# Patient Record
Sex: FEMALE | Race: WHITE | NOT HISPANIC OR LATINO | Employment: FULL TIME | URBAN - METROPOLITAN AREA
[De-identification: names, ages, dates, MRNs, and addresses within clinical notes are randomized per-mention and may not be internally consistent; named-entity substitution may affect disease eponyms.]

---

## 2017-09-25 ENCOUNTER — CONVERSION ENCOUNTER (OUTPATIENT)
Dept: MAMMOGRAPHY | Facility: HOSPITAL | Age: 47
End: 2017-09-25

## 2018-03-19 ENCOUNTER — TELEPHONE CONVERTED (OUTPATIENT)
Dept: ONCOLOGY | Facility: HOSPITAL | Age: 48
End: 2018-03-19

## 2018-05-25 ENCOUNTER — TELEPHONE CONVERTED (OUTPATIENT)
Dept: ONCOLOGY | Facility: HOSPITAL | Age: 48
End: 2018-05-25

## 2018-08-09 ENCOUNTER — TELEPHONE (OUTPATIENT)
Dept: MAMMOGRAPHY | Facility: CLINIC | Age: 48
End: 2018-08-09

## 2018-08-09 NOTE — TELEPHONE ENCOUNTER
Called pt to follow up on canceled appointment, left message for her to call the office back to let us know her decision for care. Wanting to know if she is ready to reschedule her appointment or if she found care elsewhere.     Serenity Salvador RN

## 2018-08-20 ENCOUNTER — TELEPHONE (OUTPATIENT)
Dept: MAMMOGRAPHY | Facility: CLINIC | Age: 48
End: 2018-08-20

## 2018-08-20 NOTE — TELEPHONE ENCOUNTER
Called [facility] center re: a referral they sent over on this pt. Pt was scheduled for 07/27/2018 and she canceled due to Mother in hospital. We tried to reach out to pt several times to get her rescheduled with no luck in contacting pt.     Spoke with Elena at [facility] and advised her of the above. She returned call minutes later and stated that she had spoken with pt and she is still very interested in making this appointment however there are issues with her mothers health that is keeping her from making any commitments.     She states that she will call in a few weeks and reschedule.

## 2018-09-26 ENCOUNTER — CONVERSION ENCOUNTER (OUTPATIENT)
Dept: MAMMOGRAPHY | Facility: HOSPITAL | Age: 48
End: 2018-09-26

## 2018-12-07 ENCOUNTER — TELEPHONE (OUTPATIENT)
Dept: MAMMOGRAPHY | Facility: CLINIC | Age: 48
End: 2018-12-07

## 2018-12-07 NOTE — TELEPHONE ENCOUNTER
Left patient a voicemail to call our office and let us know her status on her care for breast cancer - patient had an appointment with us back in July that was canceled and stated she would reschedule however we never heard back from her.

## 2019-02-07 ENCOUNTER — HOSPITAL ENCOUNTER (OUTPATIENT)
Dept: OTHER | Facility: HOSPITAL | Age: 49
Discharge: HOME | End: 2019-02-07
Attending: NURSE PRACTITIONER

## 2019-02-07 LAB
ALBUMIN SERPL-MCNC: 3.5 G/DL (ref 3.5–5)
ALBUMIN/GLOB SERPL: 1.2 {RATIO} (ref 1.4–2.6)
ALP SERPL-CCNC: 73 U/L (ref 42–98)
ALT SERPL-CCNC: 27 U/L (ref 10–40)
ANION GAP SERPL CALC-SCNC: 18 MMOL/L (ref 8–19)
AST SERPL-CCNC: 22 U/L (ref 15–50)
BASOPHILS # BLD AUTO: 0.02 10*3/UL (ref 0–0.2)
BASOPHILS NFR BLD AUTO: 0.43 % (ref 0–3)
BILIRUB SERPL-MCNC: 0.33 MG/DL (ref 0.2–1.3)
BUN SERPL-MCNC: 7 MG/DL (ref 5–25)
BUN/CREAT SERPL: 12 {RATIO} (ref 6–20)
CALCIUM SERPL-MCNC: 9 MG/DL (ref 8.7–10.4)
CHLORIDE SERPL-SCNC: 104 MMOL/L (ref 99–111)
CONV CO2: 23 MMOL/L (ref 22–32)
CONV TOTAL PROTEIN: 6.4 G/DL (ref 6.3–8.2)
CREAT UR-MCNC: 0.57 MG/DL (ref 0.5–0.9)
EOSINOPHIL # BLD AUTO: 0.42 10*3/UL (ref 0–0.7)
EOSINOPHIL # BLD AUTO: 8.17 % (ref 0–7)
ERYTHROCYTE [DISTWIDTH] IN BLOOD BY AUTOMATED COUNT: 11.2 % (ref 11.5–14.5)
GFR SERPLBLD BASED ON 1.73 SQ M-ARVRAT: >60 ML/MIN/{1.73_M2}
GLOBULIN UR ELPH-MCNC: 2.9 G/DL (ref 2–3.5)
GLUCOSE SERPL-MCNC: 299 MG/DL (ref 65–99)
HBA1C MFR BLD: 14.5 G/DL (ref 12–16)
HCT VFR BLD AUTO: 41.8 % (ref 37–47)
LYMPHOCYTES # BLD AUTO: 0.98 10*3/UL (ref 1–5)
MCH RBC QN AUTO: 30.8 PG (ref 27–31)
MCHC RBC AUTO-ENTMCNC: 34.7 G/DL (ref 33–37)
MCV RBC AUTO: 88.6 FL (ref 81–99)
MONOCYTES # BLD AUTO: 0.56 10*3/UL (ref 0.2–1.2)
MONOCYTES NFR BLD AUTO: 11 % (ref 3–10)
NEUTROPHILS # BLD AUTO: 3.13 10*3/UL (ref 2–8)
NEUTROPHILS NFR BLD AUTO: 61.2 % (ref 30–85)
NRBC BLD AUTO-RTO: 0 % (ref 0–0.01)
OSMOLALITY SERPL CALC.SUM OF ELEC: 301 MOSM/KG (ref 273–304)
PLATELET # BLD AUTO: 124 10*3/UL (ref 130–400)
PMV BLD AUTO: 8.2 FL (ref 7.4–10.4)
POTASSIUM SERPL-SCNC: 3.7 MMOL/L (ref 3.5–5.3)
RBC # BLD AUTO: 4.72 10*6/UL (ref 4.2–5.4)
SODIUM SERPL-SCNC: 141 MMOL/L (ref 135–147)
VARIANT LYMPHS NFR BLD MANUAL: 19.2 % (ref 20–45)
WBC # BLD AUTO: 5.11 10*3/UL (ref 4.8–10.8)

## 2019-02-08 ENCOUNTER — HOSPITAL ENCOUNTER (OUTPATIENT)
Dept: MAMMOGRAPHY | Facility: HOSPITAL | Age: 49
Discharge: HOME | End: 2019-02-08
Attending: NURSE PRACTITIONER

## 2019-02-08 LAB
CANCER AG15-3 SERPL-ACNC: 17.9 U/ML (ref 0–25)
CANCER AG27-29 SERPL-ACNC: 14.5 U/ML (ref 0–38.6)

## 2019-02-15 ENCOUNTER — HOSPITAL ENCOUNTER (OUTPATIENT)
Dept: CT IMAGING | Facility: HOSPITAL | Age: 49
Discharge: HOME | End: 2019-02-15
Attending: NURSE PRACTITIONER

## 2019-03-12 ENCOUNTER — HOSPITAL ENCOUNTER (OUTPATIENT)
Dept: OTHER | Facility: HOSPITAL | Age: 49
Discharge: HOME | End: 2019-03-12
Attending: NURSE PRACTITIONER

## 2019-03-12 LAB
BASOPHILS # BLD AUTO: 0.04 10*3/UL (ref 0–0.2)
BASOPHILS NFR BLD AUTO: 0.8 % (ref 0–3)
CONV ABS IMM GRAN: 0.02 10*3/UL (ref 0–0.2)
CONV IMMATURE GRAN: 0.4 % (ref 0–1.8)
DEPRECATED RDW RBC AUTO: 39.1 FL (ref 36.4–46.3)
EOSINOPHIL # BLD AUTO: 0.25 10*3/UL (ref 0–0.7)
EOSINOPHIL # BLD AUTO: 5.1 % (ref 0–7)
ERYTHROCYTE [DISTWIDTH] IN BLOOD BY AUTOMATED COUNT: 12 % (ref 11.7–14.4)
HBA1C MFR BLD: 15.2 G/DL (ref 12–16)
HCT VFR BLD AUTO: 43.5 % (ref 37–47)
LYMPHOCYTES # BLD AUTO: 1.16 10*3/UL (ref 1–5)
MCH RBC QN AUTO: 31.3 PG (ref 27–31)
MCHC RBC AUTO-ENTMCNC: 34.9 G/DL (ref 33–37)
MCV RBC AUTO: 89.7 FL (ref 81–99)
MONOCYTES # BLD AUTO: 0.46 10*3/UL (ref 0.2–1.2)
MONOCYTES NFR BLD AUTO: 9.4 % (ref 3–10)
NEUTROPHILS # BLD AUTO: 2.94 10*3/UL (ref 2–8)
NEUTROPHILS NFR BLD AUTO: 60.5 % (ref 30–85)
NRBC CBCN: 0 % (ref 0–0.7)
PLATELET # BLD AUTO: 117 10*3/UL (ref 130–400)
PMV BLD AUTO: 11.4 FL (ref 9.4–12.3)
RBC # BLD AUTO: 4.85 10*6/UL (ref 4.2–5.4)
VARIANT LYMPHS NFR BLD MANUAL: 23.8 % (ref 20–45)
WBC # BLD AUTO: 4.87 10*3/UL (ref 4.8–10.8)

## 2019-03-14 ENCOUNTER — HOSPITAL ENCOUNTER (OUTPATIENT)
Dept: MRI IMAGING | Facility: HOSPITAL | Age: 49
Discharge: HOME | End: 2019-03-14
Attending: NURSE PRACTITIONER

## 2019-04-01 ENCOUNTER — TELEPHONE CONVERTED (OUTPATIENT)
Dept: ONCOLOGY | Facility: HOSPITAL | Age: 49
End: 2019-04-01

## 2019-04-19 ENCOUNTER — HOSPITAL ENCOUNTER (OUTPATIENT)
Dept: OTHER | Facility: HOSPITAL | Age: 49
Discharge: HOME | End: 2019-04-19
Attending: NURSE PRACTITIONER

## 2019-05-06 ENCOUNTER — HOSPITAL ENCOUNTER (OUTPATIENT)
Dept: OTHER | Facility: HOSPITAL | Age: 49
Discharge: HOME | End: 2019-05-06
Attending: NURSE PRACTITIONER

## 2019-05-06 LAB
APPEARANCE UR: CLEAR
BILIRUB UR QL: NEGATIVE
COLOR UR: YELLOW
CONV COLLECTION SOURCE (UA): ABNORMAL
CONV CREATININE URINE, RANDOM: 42.6 MG/DL (ref 10–300)
CONV MICROALBUM.,U,RANDOM: <12 MG/L (ref 0–20)
CONV UROBILINOGEN IN URINE BY AUTOMATED TEST STRIP: 0.2 {EHRLICHU}/DL (ref 0.1–1)
GLUCOSE UR QL: >=1000 MG/DL
HGB UR QL STRIP: NEGATIVE
KETONES UR QL STRIP: 15 MG/DL
LEUKOCYTE ESTERASE UR QL STRIP: NEGATIVE
MICROALBUMIN/CREAT UR: 28.2 MG/G{CRE} (ref 0–35)
NITRITE UR QL STRIP: NEGATIVE
PH UR STRIP.AUTO: 5 [PH] (ref 5–8)
PROT UR QL: NEGATIVE MG/DL
SP GR UR: 1.04 (ref 1–1.03)

## 2019-05-24 ENCOUNTER — HOSPITAL ENCOUNTER (OUTPATIENT)
Dept: OTHER | Facility: HOSPITAL | Age: 49
Discharge: HOME | End: 2019-05-24
Attending: NURSE PRACTITIONER

## 2019-06-14 ENCOUNTER — HOSPITAL ENCOUNTER (OUTPATIENT)
Dept: OTHER | Facility: HOSPITAL | Age: 49
Discharge: HOME | End: 2019-06-14
Attending: NURSE PRACTITIONER

## 2019-06-14 LAB
ALBUMIN SERPL-MCNC: 4.1 G/DL (ref 3.5–5)
ALBUMIN/GLOB SERPL: 1.6 {RATIO} (ref 1.4–2.6)
ALP SERPL-CCNC: 77 U/L (ref 42–98)
ALT SERPL-CCNC: 28 U/L (ref 10–40)
ANION GAP SERPL CALC-SCNC: 19 MMOL/L (ref 8–19)
AST SERPL-CCNC: 38 U/L (ref 15–50)
BASOPHILS # BLD AUTO: 0.03 10*3/UL (ref 0–0.2)
BASOPHILS NFR BLD AUTO: 0.4 % (ref 0–3)
BILIRUB SERPL-MCNC: 0.27 MG/DL (ref 0.2–1.3)
BUN SERPL-MCNC: 14 MG/DL (ref 5–25)
BUN/CREAT SERPL: 25 {RATIO} (ref 6–20)
CALCIUM SERPL-MCNC: 8.9 MG/DL (ref 8.7–10.4)
CHLORIDE SERPL-SCNC: 98 MMOL/L (ref 99–111)
CONV ABS IMM GRAN: 0.03 10*3/UL (ref 0–0.2)
CONV CO2: 24 MMOL/L (ref 22–32)
CONV IMMATURE GRAN: 0.4 % (ref 0–1.8)
CONV TOTAL PROTEIN: 6.7 G/DL (ref 6.3–8.2)
CREAT UR-MCNC: 0.56 MG/DL (ref 0.5–0.9)
DEPRECATED RDW RBC AUTO: 39.9 FL (ref 36.4–46.3)
EOSINOPHIL # BLD AUTO: 0.24 10*3/UL (ref 0–0.7)
EOSINOPHIL # BLD AUTO: 3.2 % (ref 0–7)
ERYTHROCYTE [DISTWIDTH] IN BLOOD BY AUTOMATED COUNT: 11.9 % (ref 11.7–14.4)
FSH SERPL-ACNC: 35.9 M[IU]/ML
GFR SERPLBLD BASED ON 1.73 SQ M-ARVRAT: >60 ML/MIN/{1.73_M2}
GLOBULIN UR ELPH-MCNC: 2.6 G/DL (ref 2–3.5)
GLUCOSE SERPL-MCNC: 287 MG/DL (ref 65–99)
HBA1C MFR BLD: 15.2 G/DL (ref 12–16)
HCT VFR BLD AUTO: 44 % (ref 37–47)
LH SERPL-ACNC: 19.2 M[IU]/ML
LYMPHOCYTES # BLD AUTO: 1.43 10*3/UL (ref 1–5)
MCH RBC QN AUTO: 31.5 PG (ref 27–31)
MCHC RBC AUTO-ENTMCNC: 34.5 G/DL (ref 33–37)
MCV RBC AUTO: 91.3 FL (ref 81–99)
MONOCYTES # BLD AUTO: 0.67 10*3/UL (ref 0.2–1.2)
MONOCYTES NFR BLD AUTO: 8.9 % (ref 3–10)
NEUTROPHILS # BLD AUTO: 5.15 10*3/UL (ref 2–8)
NEUTROPHILS NFR BLD AUTO: 68.2 % (ref 30–85)
NRBC CBCN: 0 % (ref 0–0.7)
OSMOLALITY SERPL CALC.SUM OF ELEC: 295 MOSM/KG (ref 273–304)
PLATELET # BLD AUTO: 124 10*3/UL (ref 130–400)
PMV BLD AUTO: 11.8 FL (ref 9.4–12.3)
POTASSIUM SERPL-SCNC: 3.8 MMOL/L (ref 3.5–5.3)
RBC # BLD AUTO: 4.82 10*6/UL (ref 4.2–5.4)
SODIUM SERPL-SCNC: 137 MMOL/L (ref 135–147)
VARIANT LYMPHS NFR BLD MANUAL: 18.9 % (ref 20–45)
WBC # BLD AUTO: 7.55 10*3/UL (ref 4.8–10.8)

## 2019-06-15 LAB — CANCER AG27-29 SERPL-ACNC: 21.7 U/ML (ref 0–38.6)

## 2019-06-17 ENCOUNTER — HOSPITAL ENCOUNTER (OUTPATIENT)
Dept: GENERAL RADIOLOGY | Facility: HOSPITAL | Age: 49
Discharge: HOME | End: 2019-06-17
Attending: NURSE PRACTITIONER

## 2019-07-12 ENCOUNTER — HOSPITAL ENCOUNTER (OUTPATIENT)
Dept: OTHER | Facility: HOSPITAL | Age: 49
Discharge: HOME | End: 2019-07-12
Attending: NURSE PRACTITIONER

## 2019-09-20 ENCOUNTER — HOSPITAL ENCOUNTER (OUTPATIENT)
Dept: OTHER | Facility: HOSPITAL | Age: 49
Discharge: HOME | End: 2019-09-20
Attending: NURSE PRACTITIONER

## 2019-09-27 ENCOUNTER — HOSPITAL ENCOUNTER (OUTPATIENT)
Dept: URGENT CARE | Facility: CLINIC | Age: 49
Discharge: HOME | End: 2019-09-27

## 2019-09-30 ENCOUNTER — HOSPITAL ENCOUNTER (OUTPATIENT)
Dept: MAMMOGRAPHY | Facility: HOSPITAL | Age: 49
Discharge: HOME | End: 2019-09-30
Attending: NURSE PRACTITIONER

## 2019-10-11 ENCOUNTER — HOSPITAL ENCOUNTER (OUTPATIENT)
Dept: OTHER | Facility: HOSPITAL | Age: 49
Discharge: HOME | End: 2019-10-11
Attending: NURSE PRACTITIONER

## 2019-10-11 LAB
BASOPHILS # BLD AUTO: 0.06 10*3/UL (ref 0–0.2)
BASOPHILS NFR BLD AUTO: 1 % (ref 0–3)
CONV ABS IMM GRAN: 0.03 10*3/UL (ref 0–0.2)
CONV IMMATURE GRAN: 0.5 % (ref 0–1.8)
DEPRECATED RDW RBC AUTO: 38.5 FL (ref 36.4–46.3)
EOSINOPHIL # BLD AUTO: 0.37 10*3/UL (ref 0–0.7)
EOSINOPHIL # BLD AUTO: 6.3 % (ref 0–7)
ERYTHROCYTE [DISTWIDTH] IN BLOOD BY AUTOMATED COUNT: 11.7 % (ref 11.7–14.4)
FOLATE SERPL-MCNC: >20 NG/ML (ref 4.8–20)
HCT VFR BLD AUTO: 46.3 % (ref 37–47)
HGB BLD-MCNC: 15.9 G/DL (ref 12–16)
LYMPHOCYTES # BLD AUTO: 1.31 10*3/UL (ref 1–5)
LYMPHOCYTES NFR BLD AUTO: 22.2 % (ref 20–45)
MCH RBC QN AUTO: 31 PG (ref 27–31)
MCHC RBC AUTO-ENTMCNC: 34.3 G/DL (ref 33–37)
MCV RBC AUTO: 90.3 FL (ref 81–99)
MONOCYTES # BLD AUTO: 0.44 10*3/UL (ref 0.2–1.2)
MONOCYTES NFR BLD AUTO: 7.5 % (ref 3–10)
NEUTROPHILS # BLD AUTO: 3.68 10*3/UL (ref 2–8)
NEUTROPHILS NFR BLD AUTO: 62.5 % (ref 30–85)
NRBC CBCN: 0 % (ref 0–0.7)
PLATELET # BLD AUTO: 185 10*3/UL (ref 130–400)
PMV BLD AUTO: 11 FL (ref 9.4–12.3)
RBC # BLD AUTO: 5.13 10*6/UL (ref 4.2–5.4)
VIT B12 SERPL-MCNC: 748 PG/ML (ref 211–911)
WBC # BLD AUTO: 5.89 10*3/UL (ref 4.8–10.8)

## 2019-11-08 ENCOUNTER — HOSPITAL ENCOUNTER (OUTPATIENT)
Dept: OTHER | Facility: HOSPITAL | Age: 49
Discharge: HOME | End: 2019-11-08
Attending: NURSE PRACTITIONER

## 2019-12-13 ENCOUNTER — HOSPITAL ENCOUNTER (OUTPATIENT)
Dept: OTHER | Facility: HOSPITAL | Age: 49
Discharge: HOME | End: 2019-12-13
Attending: NURSE PRACTITIONER

## 2020-01-02 ENCOUNTER — HOSPITAL ENCOUNTER (OUTPATIENT)
Dept: URGENT CARE | Facility: CLINIC | Age: 50
Discharge: HOME | End: 2020-01-02

## 2020-01-09 ENCOUNTER — HOSPITAL ENCOUNTER (OUTPATIENT)
Dept: OTHER | Facility: HOSPITAL | Age: 50
Discharge: HOME | End: 2020-01-09
Attending: NURSE PRACTITIONER

## 2020-01-10 LAB — 1,25(OH)2D3 SERPL-MCNC: 71.9 PG/ML (ref 19.9–79.3)

## 2020-04-10 ENCOUNTER — HOSPITAL ENCOUNTER (OUTPATIENT)
Dept: MRI IMAGING | Facility: HOSPITAL | Age: 50
Discharge: HOME | End: 2020-04-10
Attending: NURSE PRACTITIONER

## 2020-04-10 LAB
CREAT BLD-MCNC: 0.6 MG/DL (ref 0.6–1.4)
GFR SERPLBLD BASED ON 1.73 SQ M-ARVRAT: >60 ML/MIN/{1.73_M2}

## 2020-05-29 ENCOUNTER — HOSPITAL ENCOUNTER (OUTPATIENT)
Dept: NUCLEAR MEDICINE | Facility: HOSPITAL | Age: 50
Discharge: HOME | End: 2020-05-29
Attending: NURSE PRACTITIONER

## 2020-06-09 ENCOUNTER — OFFICE VISIT CONVERTED (OUTPATIENT)
Dept: ONCOLOGY | Facility: HOSPITAL | Age: 50
End: 2020-06-09
Attending: NURSE PRACTITIONER

## 2020-08-06 ENCOUNTER — HOSPITAL ENCOUNTER (OUTPATIENT)
Dept: OTHER | Facility: HOSPITAL | Age: 50
Discharge: HOME | End: 2020-08-06
Attending: NURSE PRACTITIONER

## 2020-08-06 ENCOUNTER — OFFICE VISIT CONVERTED (OUTPATIENT)
Dept: ONCOLOGY | Facility: HOSPITAL | Age: 50
End: 2020-08-06
Attending: NURSE PRACTITIONER

## 2020-08-06 LAB
ALBUMIN SERPL-MCNC: 4.1 G/DL (ref 3.5–5)
ALBUMIN/GLOB SERPL: 1.6 {RATIO} (ref 1.4–2.6)
ALP SERPL-CCNC: 89 U/L (ref 42–98)
ALT SERPL-CCNC: 24 U/L (ref 10–40)
ANION GAP SERPL CALC-SCNC: 26 MMOL/L (ref 8–19)
AST SERPL-CCNC: 26 U/L (ref 15–50)
BASOPHILS # BLD AUTO: 0.04 10*3/UL (ref 0–0.2)
BASOPHILS NFR BLD AUTO: 0.6 % (ref 0–3)
BILIRUB SERPL-MCNC: 0.35 MG/DL (ref 0.2–1.3)
BUN SERPL-MCNC: 3 MG/DL (ref 5–25)
BUN/CREAT SERPL: 5 {RATIO} (ref 6–20)
CALCIUM SERPL-MCNC: 8.8 MG/DL (ref 8.7–10.4)
CHLORIDE SERPL-SCNC: 96 MMOL/L (ref 99–111)
CONV ABS IMM GRAN: 0.03 10*3/UL (ref 0–0.2)
CONV CO2: 21 MMOL/L (ref 22–32)
CONV IMMATURE GRAN: 0.4 % (ref 0–1.8)
CONV TOTAL PROTEIN: 6.7 G/DL (ref 6.3–8.2)
CREAT UR-MCNC: 0.66 MG/DL (ref 0.5–0.9)
DEPRECATED RDW RBC AUTO: 38.9 FL (ref 36.4–46.3)
EOSINOPHIL # BLD AUTO: 0.28 10*3/UL (ref 0–0.7)
EOSINOPHIL # BLD AUTO: 4.1 % (ref 0–7)
ERYTHROCYTE [DISTWIDTH] IN BLOOD BY AUTOMATED COUNT: 11.9 % (ref 11.7–14.4)
FOLATE SERPL-MCNC: 18.8 NG/ML (ref 4.8–20)
GFR SERPLBLD BASED ON 1.73 SQ M-ARVRAT: >60 ML/MIN/{1.73_M2}
GLOBULIN UR ELPH-MCNC: 2.6 G/DL (ref 2–3.5)
GLUCOSE SERPL-MCNC: 216 MG/DL (ref 65–99)
HCT VFR BLD AUTO: 43.7 % (ref 37–47)
HGB BLD-MCNC: 14.9 G/DL (ref 12–16)
LDH SERPL-CCNC: 167 U/L (ref 120–240)
LYMPHOCYTES # BLD AUTO: 2.06 10*3/UL (ref 1–5)
LYMPHOCYTES NFR BLD AUTO: 30.4 % (ref 20–45)
MCH RBC QN AUTO: 30.8 PG (ref 27–31)
MCHC RBC AUTO-ENTMCNC: 34.1 G/DL (ref 33–37)
MCV RBC AUTO: 90.3 FL (ref 81–99)
MONOCYTES # BLD AUTO: 0.61 10*3/UL (ref 0.2–1.2)
MONOCYTES NFR BLD AUTO: 9 % (ref 3–10)
NEUTROPHILS # BLD AUTO: 3.75 10*3/UL (ref 2–8)
NEUTROPHILS NFR BLD AUTO: 55.5 % (ref 30–85)
NRBC CBCN: 0 % (ref 0–0.7)
OSMOLALITY SERPL CALC.SUM OF ELEC: 291 MOSM/KG (ref 273–304)
PLATELET # BLD AUTO: 153 10*3/UL (ref 130–400)
PMV BLD AUTO: 11.5 FL (ref 9.4–12.3)
POTASSIUM SERPL-SCNC: 3.6 MMOL/L (ref 3.5–5.3)
RBC # BLD AUTO: 4.84 10*6/UL (ref 4.2–5.4)
SODIUM SERPL-SCNC: 139 MMOL/L (ref 135–147)
VIT B12 SERPL-MCNC: 405 PG/ML (ref 211–911)
WBC # BLD AUTO: 6.77 10*3/UL (ref 4.8–10.8)

## 2020-11-19 ENCOUNTER — HOSPITAL ENCOUNTER (OUTPATIENT)
Dept: GENERAL RADIOLOGY | Facility: HOSPITAL | Age: 50
Discharge: HOME | End: 2020-11-19
Attending: NURSE PRACTITIONER

## 2020-12-17 ENCOUNTER — HOSPITAL ENCOUNTER (OUTPATIENT)
Dept: GENERAL RADIOLOGY | Facility: HOSPITAL | Age: 50
Discharge: HOME | End: 2020-12-17
Attending: NURSE PRACTITIONER

## 2020-12-18 ENCOUNTER — HOSPITAL ENCOUNTER (OUTPATIENT)
Dept: OTHER | Facility: HOSPITAL | Age: 50
Discharge: HOME | End: 2020-12-18
Attending: NURSE PRACTITIONER

## 2021-02-12 ENCOUNTER — OFFICE VISIT CONVERTED (OUTPATIENT)
Dept: ONCOLOGY | Facility: HOSPITAL | Age: 51
End: 2021-02-12
Attending: NURSE PRACTITIONER

## 2021-03-02 ENCOUNTER — HOSPITAL ENCOUNTER (OUTPATIENT)
Dept: OTHER | Facility: HOSPITAL | Age: 51
Discharge: HOME | End: 2021-03-02
Attending: NURSE PRACTITIONER

## 2021-03-08 LAB — 1,25(OH)2D3 SERPL-MCNC: 53.2 PG/ML (ref 19.9–79.3)

## 2021-04-16 ENCOUNTER — HOSPITAL ENCOUNTER (OUTPATIENT)
Dept: OTHER | Facility: HOSPITAL | Age: 51
Discharge: HOME | End: 2021-04-16
Attending: NURSE PRACTITIONER

## 2021-05-21 ENCOUNTER — HOSPITAL ENCOUNTER (OUTPATIENT)
Dept: OTHER | Facility: HOSPITAL | Age: 51
Discharge: HOME | End: 2021-05-21
Attending: EMERGENCY MEDICINE

## 2021-05-23 ENCOUNTER — TRANSCRIBE ORDERS (OUTPATIENT)
Dept: ONCOLOGY | Facility: HOSPITAL | Age: 51
End: 2021-05-23

## 2021-05-23 DIAGNOSIS — Z12.31 SCREENING MAMMOGRAM, ENCOUNTER FOR: ICD-10-CM

## 2021-05-23 DIAGNOSIS — Z78.0 POST-MENOPAUSAL: Primary | ICD-10-CM

## 2021-05-25 LAB
QUANTIFERON MITOGEN VALUE: NORMAL
QUANTIFERON NIL VALUE: NORMAL
QUANTIFERON TB1 AG VALUE: NORMAL IU/ML
QUANTIFERON TB2 AG VALUE: NORMAL

## 2021-05-28 VITALS
BODY MASS INDEX: 30.34 KG/M2 | TEMPERATURE: 98.1 F | WEIGHT: 160.72 LBS | HEART RATE: 85 BPM | SYSTOLIC BLOOD PRESSURE: 125 MMHG | HEIGHT: 61 IN | RESPIRATION RATE: 16 BRPM | DIASTOLIC BLOOD PRESSURE: 79 MMHG | OXYGEN SATURATION: 98 %

## 2021-05-28 NOTE — PROGRESS NOTES
Patient: BARBRA POWELL     Acct: [id number]     Report: #[id number]  UNIT #: [id number]     : 1970    Encounter Date:2021  PRIMARY CARE: Ava Li  ***Signed***  --------------------------------------------------------------------------------------------------------------------  TELEHEALTH NOTE      Past Oncology Illness History      This is a very pleasant 46-year-old female who presents to [facility]     for further treatment of left breast cancer.            1) Left breast Cancer: dx US bx: (16): 2.4 x 2.5 cm at 8:00 position.     Invasive ductal carcinoma: ER/KS+, Her 2neu 2+ by IHC. Her 2neu positive on     final surgical path.             Treatment:             1)Neoadjuvant chemotherapy AC x 4 cycles completed. (16)      2)Paclitaxel x 12 cycles completed. (3/22/17)      3)Breast MRI: 2 cm x 1 cm irregular mass in the inf. medial left breast with     invasion of the left pectoralis major muscle. Minimal residual foci of type 1 /     2 enhancement. (3/29/17).      4)Left breast mastectomy. final path: 2.5x1.1x1.1 cm mass. Tumor extension into     the skeletal muscle. No DCIS. No dermal lymphatic invasion. Tumor comes within     1.9 mm of deep margin. 3.5 cm of medial margin and 10 cm of lateral margin.     [address] x 4 are (-). ER+ (95%), KS+ (80%), Ki67 2%, Her 2neu 3+. (17).      5). Completed Docetaxel / Carboplatin/Herceptin. (17 - 17).      6) PET scan: left chest wall activity of mastectomy site. (3/14/18).       7) CT CAP: no evidence of metastatic disease. coarse subpleural interstitial     thickening of the ant. left upper lobe / lingular comp. with radiation fibrosis.    (18).      8) Completed 12 cycles of Herceptin. (18).       9) Radiation to the left chest wall. (17 - 18)      10) Right breast pain. Right breast US was normal. (19).      11) MRI of right breast. No signs of malignancy. (3/2019)/      12)  Discontinue Tamoxifen. Start Aromasin. Check menopausal status: (6/14/19.      13) Tolerating Aromasin. (7/12/19).      14) Mammogram: benign. (9/30/19) Alternate breast MRI right breast every 6     months for dense breasts.       15) maintenance Aromasin. (1/9/20)      16) Switched back to Tamoxifen due to severe arthraliga's secondary to Aromasin:    (5/2020)      17) Started Duloxetine after discontinuation of Exemestane for arthralgia's.     (6/09/20)      18) Taking Duloxetine and Tamoxifen. Tolerating well. (8/6/20)      19) Right mammo: benign: (11/19/20)            History of Present Illness            Chief Complaint: (breast ca)            Enriqueta Massey is presenting for evaluation via Telehealth visit by phone.     Verbal consent obtained before beginning visit.            Provider spent (11) minutes with the patient during telehealth visit.            The following staff were present during the visit: ( Alyse Varma, APRN)                         Overview of Symptoms      1) Left breast Cancer: dx US bx: (8/17/16): 2.4 x 2.5 cm at 8:00 position.     Invasive ductal carcinoma: ER/OH+, Her 2neu 2+ by IHC. Her 2neu positive on     final surgical path. Stage II, pT2, pN0.             History of stage II left breast hormone positive cancer diagnosed in August of 2016. She is status post neoadjuvant chemotherapy with  AC, Paclitaxel,  left     mastectomy, then was found to have Her 2 positive disease on final path and then    completed Docetaxel, Carboplatin and  1 year of Herceptin. [place], completed     radiation to the left chest wall secondary to tumor extension into skeletal     muscle.             Initially started on Tamoxifen for endocrine therapy, then discontinued and did     Exemestane for a short time and discontinued to severe arthralgia's, and now     return back to Tamoxifen use.She reports she is tolerating Tamoxifen without any    intolerable side effects currently. She denies any  vaginal bleeding or disch    arge. She follows with GYN and recent pap smear was normal.             She does report left rib pain has improved and therefore discontinued the     Duloxetine she was taking for arthralgia's and discontinued due to not sleeping     well and felt like the Duloxetine was worsening her depression.             She still has port and receives port flushes.             She denies any frequent or persistent headaches, cough, or bone pain.             We discussed colonoscopy screening now that she is age 50. She will follow up     with her APRN PCP for this order.             2) Osteopenia: Dexa scan due in 2021. Reports she still has     intermittent arthralgia's mostly in the wrists. History of low Vitamin D in the     past. She was taking Duloxetine, but has since discontinued due to she thought i    s was worsening her depression and could not sleep. We will recheck her Vitamin     D level with her next port flush.             3) Vitamin D deficiency: Taking Calcium and Vitamin D currently as directed.     Previous Vitamin D levels have been very low. Unsure of replacement. Will     recheck levels today.            Most Recent Lab Findings      [facility]                PACS RADIOLOGY REPORT            Patient: BARBRA POWELL   Acct: #[id number]   Report: #[id number]            UNIT #: [id number]    DOS: 20 1528      INSURANCE:**TYPE IN NAME OF INSURANCE**   ORDER #:[id number]      LOCATION:[facility]     : 1970            PROVIDERS      ADMITTING:     ATTENDING: SHONNA PEDRAZA      FAMILY:  Guillermo,Ava   ORDERING:  SHONNA PEDRAZA         OTHER:    DICTATING:  Victor M Cabrera MD            REQ #:[id number]   EXAM:[id number] - DIG SCREENING RIGHT KIMBERLY w RASHIDA      REASON FOR EXAM:  SCREENING      REASON FOR VISIT:  SCREENING            *******Signed******         PROCEDURE:   DIGITAL SCREENING  RIGHT MAMOGRAM WITH 3D TOMOSYNTHESIS             COMPARISON:   [facility], , DIG DIAG RIGHT KIMBERLY W 3D RASHIDA,     2/08/2019, 11:08.  [facility], , DIG SCREENING RIGHT KIMBERLY W RASHIDA, 9/26/2018, 11:08.  [facility],       , DIGITAL DIAG UNIL W/CAD RIGHT, 9/25/2017, 11:44.  [facility],     , DIG SCREENING       RIGHT KIMBERLY W RASHIDA, 9/30/2019, 8:55.             VIEWS:  RIGHT CC AND MLO VIEWS WERE OBTAINED UTILIZING 3D TOMOSYNTHESIS AND R2     CAD SOFTWARE             INDICATIONS:   SCREENING             FINDINGS:      No suspicious mass, area of architectural distortion or suspicious     microcalcification is       identified.              CONCLUSION:      Benign right mammogram. Suggest routine mammographic screening.             Post left mastectomy.             RECOMMENDATION(S):          ROUTINE MAMMOGRAM AND CLINICAL EVALUATION IN 12 MONTHS.               BIRADS:          DIAGNOSTIC CATEGORY 1--NEGATIVE.               BREAST COMPOSITION:   Heterogeneously dense,which may obscure small masses.             PLEASE NOTE:  A NORMAL MAMMOGRAM DOES NOT EXCLUDE THE POSSIBILITY OF BREAST     CANCER.       ANY CLINICALLY SUSPICIOUS PALPABLE LUMP SHOULD BE BIOPSIED.                DARRYN AYON MD             Electronically Signed and Approved By: DARRYN AYON MD on 11/20/2020 at 8:49            Allergies/Medications      Allergies:        Coded Allergies:             MORPHINE (Verified  Allergy, Severe, ANXIETY, 2/12/21)           ADHESIVE TAPE (Verified  Allergy, Mild, RASH, 2/12/21)           SULFA (SULFONAMIDE ANTIBIOTICS) (Verified  Allergy, Mild, RASH, 2/12/21)      Medications    Last Reconciled on 2/12/21 10:35 by SHONNA PEDRAZA      Tamoxifen Citrate (Tamoxifen*) 20 Mg Tab      20 MG PO QDAY, #90 TAB 3 Refills         Prov: SHONNA PEDRAZA onc         2/12/21       metFORMIN ER (metFORMIN ER) 500 Mg Tab.er.24      850 MG PO BID, #30 TAB.ER 0  Refills         Prov: SHONNA PEDRAZA onc         8/6/20       Cetirizine Hcl (zyrTEC) 10 Mg Tablet      10 MG PO QDAY, #30 TAB 0 Refills         Reported         10/11/19       Ondansetron Hcl (ONDANSETRON HCL) 4 Mg Tablet      8 MG PO TID for nausea for 30 Days, #60 TAB 0 Refills         Prov: SHONNA PEDRAZA onc         7/12/19       Cholecalciferol (Vitamin D3) (Vitamin D3) 2,000 U Tablet      2000 UNITS PO 2X/WEEK, #30 TAB 0 Refills         Reported         6/6/18       Omega-3 Fatty Acids/Fish Oil (Fish Oil 1000 Mg) 1 Each Capsule      1 GM PO HS, #30 CAP 0 Refills         Reported         6/6/18       SITagliptin (Januvia) 50 Mg Tablet      100 MG PO QDAY, TAB         Reported         10/4/16       Levothyroxine (Synthroid) 0.05 Mg Tablet      50 MCG PO QAM, #90 TAB 0 Refills         Prov: *TABATHA SUMNER [facility]         2/17/11            Plan/Instructions      Ambulatory Assessment/Plan:        Osteopenia         Osteopenia, unspecified location - M85.80         Osteopenia location: unspecified            Breast cancer - C50.919         Breast location: unspecified site of breast         Estrogen receptor status: positive         Laterality: left            Vitamin D deficiency - E55.9            H/O total mastectomy of left breast - Z90.12            Notes      Renewed Medications      * Tamoxifen Citrate (Tamoxifen*) 20 MG TAB: 20 MG PO QDAY #90      Discontinued Medications      * DULoxetine (Cymbalta) 60 MG CAPSULE.DR: 60 MG PO QDAY #90      New Diagnostics      * Bone Densitometry DEXA, 6 Months         Dx: Osteopenia - M85.80      * Vitamin D Level, As Soon As Possible         Dx: Osteopenia - M85.80      * Screening Mammo Right, 6 Months         Dx: H/O total mastectomy of left breast - Z90.12      New Office Procedures      * Port Flush, As Soon As Possible         Dx: Breast cancer - C50.919      Plan/Instructions      1) Mammogram due in November 2021.             Continue Tamoxifen. Refills  sent.             2) Dexa scan due in June of 2021.             3) Port flush to be scheduled and Vitamin D level. If Vitamin D still low, then     will start high dose Vitamin D replacement.             Follow up with PCP for colonoscopy screening order.             Call with any questions or concerns.             Follow up with NP in 6 months. Will call with results once they are back.       * Plan Of Care: ()            * Chronic conditions reviewed and taken into consideration for today's treatment       plan.      * Patient instructed to seek medical attention urgently for new or worsening       symptoms.      * Patient was educated/instructed on their diagnosis, treatment and medications       prior to discharge from the clinic today.      Codes:  Phone Eval 11-20 mi [id number]            Copies To:      Ava Li            Electronically signed by SHONNA PEDRAZA onc  02/12/2021 10:36       Disclaimer: Converted document may not contain table formatting or lab diagrams. Please see [employer] for the authenticated document.

## 2021-05-28 NOTE — PROGRESS NOTES
Patient: ENRIQUETA POWELL     Acct: [id number]     Report: #[id number]  UNIT #: [id number]     : 1970    Encounter Date:2020  PRIMARY CARE: Ava Li  ***Signed***  --------------------------------------------------------------------------------------------------------------------  TELEHEALTH NOTE      History of Present Illness            Chief Complaint: (BREAST CANCER)            Enriqueta Powell is presenting for evaluation via Telehealth visit by phone.     Verbal consent obtained before beginning visit.            Provider spent (13) minutes with the patient during telehealth visit.            The following staff were present during the visit: ( Alyse Varma, ERROL)                         Past Med History      This is a very pleasant 46-year-old female who presents to [facility]     for further treatment of left breast cancer.            1) Left breast Cancer: dx US bx: (16): 2.4 x 2.5 cm at 8:00 position.     Invasive ductal carcinoma: ER/AL+, Her 2neu 2+ by IHC. Her 2neu positive on     final surgical path.             Treatment:             1)Neoadjuvant chemotherapy AC x 4 cycles completed. (16)      2)Paclitaxel x 12 cycles completed. (3/22/17)      3)Breast MRI: 2 cm x 1 cm irregular mass in the inf. medial left breast with     invasion of the left pectoralis major muscle. Minimal residual foci of type 1 /     2 enhancement. (3/29/17).      4)Left breast mastectomy. final path: 2.5x1.1x1.1 cm mass. Tumor extension into     the skeletal muscle. No DCIS. No dermal lymphatic invasion. Tumor comes within     1.9 mm of deep margin. 3.5 cm of medial margin and 10 cm of lateral margin.     [place] [address] x 4 are (-). ER+ (95%), AL+ (80%), Ki67 2%, Her 2neu 3+. (17).      5). Completed Docetaxel / Carboplatin/Herceptin. (17 - 17).      6) PET scan: left chest wall activity of mastectomy site. (3/14/18).       7) CT CAP: no evidence of metastatic disease.  coarse subpleural interstitial     thickening of the ant. left upper lobe / lingular comp. with radiation fibrosis.    (8/7/18).      8) Completed 12 cycles of Herceptin. (9/26/18).       9) Radiation to the left chest wall. (11/27/17 - 1/8/18)      10) Right breast pain. Right breast US was normal. (2/9/19).      11) MRI of right breast. No signs of malignancy. (3/2019)/      12) Discontinue Tamoxifen. Start Aromasin. Check menopausal status: (6/14/19.      13) Tolerating Aromasin. (7/12/19).      14) Mammogram: benign. (9/30/19) Alternate breast MRI right breast every 6     months for dense breasts.       15) maintenance Aromasin. (1/9/20)      16) Discontinue Aromasin x 1 month. Start Duloxetine after 1 week off Aromasin.     Arthraliga's improved. (5/7/20)      17) Restarted Tamoxifen. Continue Duloxetine. Bone scan negative for any osseous    mets. (6/9/20)      Overview of Symptoms            1) Left breast Cancer: dx US bx: (8/17/16): 2.4 x 2.5 cm at 8:00 position.     Invasive ductal carcinoma: ER/KS+, Her 2neu 2+ by IHC. Her 2neu positive on     final surgical path.             This is a telehealth follow up visit with Mrs. Enriqueta Massey after her last visit    one month ago. She has history of left breast cancer diagnosed in 8/2016. She     has completed neoadjuvant chemotherapy with AC followed by 12 cycles of     Paclitaxel. This was followed by left breast mastectomy. On final path: she was     found to be HER 2neu positive. Subsequently, she was given Docetaxel,     Carboplatin followed by 1 year of Herceptin and subsequently had radiation to     the chest wall completing this in January 2018.             She was initially started on Tamoxifen in late 2018 and thus had severe hot     flashes and switched to Exemestane in July 2019. Recently, reported severe     arthraliga's and felt to related to anxiety as well. At her last visit, we     discussed ordering a bone scan because she felt the arthralgia's  were worsening.    A bone scan was completed and did not show any osseous met lesions and thus     benign. We planned to stop the Exemestane for a month to see if arthraliga's     improved and started Duloxetine several days after stopping the Exemestane.             Mrs. Powell reports she feels much better after stopping the Exemestane. In     addition, she has started the Duloxetine and the arthralgia's have all but     mostly disappeared. She reports she has stopped the Duloxetine intermittently     and the joint pains return.             In addition, she would like to return to Tamoxifen use.             2) Arthraliga's: Improving with Duloxetine and off of Exemestane.             3) Thrombocytopenia: Reviewed her blood work from Dr. Li's office. Platelet    count of 138,000. She has been low normal several times on previous lab work. In    addition, a B-12,folate were completed. Folate level is normal, but B-12 was low    normal at 232.             We will initiate oral B-12 1000 mcg PO QD and recheck levels in 2 months with     CBC.            Most Recent Lab Findings      [facility]                PACS RADIOLOGY REPORT            Patient: BARBRA POWELL   Acct: #[id number]   Report: #[id number]            UNIT #: [id number]    DOS: 20 0930      INSURANCE:**TYPE IN NAME OF INSURANCE**   ORDER #:[id number]      LOCATION:NM     : 1970            PROVIDERS      ADMITTING:     ATTENDING: SHONNA PEDRAZA      FAMILY:  EVELYN SAVAGE   ORDERING:  SHONNA PEDRAZA onc         OTHER:    DICTATING:  Jason Alejandro MD            REQ #:[id number]   EXAM:BOW - BONE SCAN WHOLE BODY      REASON FOR EXAM:  JOINT PAIN BREAST CA      REASON FOR VISIT:  JOINT PAIN BREAST CA            *******Signed******         PROCEDURE:   NM BONE IMAGING WHOLE BODY             COMPARISON:   None.             INDICATIONS:   JOINT PAIN BREAST CA              TECHNIQUE:   After obtaining the patient's consent, Technetium 99m MDP was     injected intravenously.       Images were obtained approximately two hours later.               RADIONUCLIDE:         20.2mCi    Tc99m MDP - I.V.             FINDINGS:         No focal osseous lesion is identified.  Focus of increased activity is noted in     the left maxilla,       favored to be secondary to dental pathology.             Soft tissue activity appears within normal limits.  Increased activity is noted     in several joints       of the appendicular skeleton, likely arthritic in etiology.             CONCLUSION:         1. No specific scintigraphic evidence osseous metastatic disease             Jason Alejandro M.D.             Electronically Signed and Approved By: Jason Alejandro M.D. on 5/29/2020 at 12:49                           Allergies/Medications      Allergies:        Coded Allergies:             MORPHINE (Verified  Allergy, Severe, ANXIETY, 6/9/20)           ADHESIVE TAPE (Verified  Allergy, Mild, RASH, 6/9/20)           SULFA (SULFONAMIDE ANTIBIOTICS) (Verified  Allergy, Mild, RASH, 6/9/20)      Medications    Last Reconciled on 6/9/20 10:40 by SHONNA PEDRAZA      DULoxetine (Cymbalta) 60 Mg Capsule.dr      60 MG PO QDAY, #90 CAP 1 Refill         Prov: SHONNA PEDRAZA onc         6/9/20       Tamoxifen Citrate (Tamoxifen*) 20 Mg Tab      20 MG PO QDAY, #90 TAB 1 Refill         Prov: SHONNA PEDRAZA onc         6/9/20       Cetirizine Hcl (zyrTEC) 10 Mg Tablet      10 MG PO QDAY, #30 TAB 0 Refills         Reported         10/11/19       Ondansetron Hcl (ONDANSETRON HCL) 4 Mg Tablet      8 MG PO TID for nausea for 30 Days, #60 TAB 0 Refills         Prov: SHONNA PEDRAZA onc         7/12/19       Cholecalciferol (Vitamin D3*) 2,000 U Tablet      2000 UNITS PO 2X/WEEK, #30 TAB 0 Refills         Reported         6/6/18       Omega-3 Fatty Acids/Fish Oil (Fish Oil 1000 Mg) 1 Each Capsule      1 GM PO  HS, #30 CAP 0 Refills         Reported         6/6/18       metFORMIN ER (metFORMIN ER) 500 Mg Tab.er.24      500 MG PO BID, #30 TAB.ER 0 Refills         Reported         6/1/17       SITagliptin (Januvia) 50 Mg Tablet      100 MG PO QDAY, TAB         Reported         10/4/16       Levothyroxine (Synthroid) 0.05 Mg Tablet      50 MCG PO QAM, #90 TAB 0 Refills         Prov: *TABATHA SUMNER [facility]         2/17/11            Plan/Instructions      Ambulatory Assessment/Plan:        Breast cancer         Malignant neoplasm of lower-outer quadrant of left breast of female, estrogen       receptor positive - C50.512, Z17.0         Breast location: lower outer quadrant of breast         Estrogen receptor status: positive         Patient sex: female         Laterality: left            Leukopenia - D72.819            Thrombocytopenia - D69.6            Notes      New Medications      * Tamoxifen Citrate (Tamoxifen*) 20 MG TAB: 20 MG PO QDAY #90      * DULoxetine (Cymbalta) 60 MG CAPSULE.DR: 60 MG PO QDAY #90      Discontinued Medications      * EXEMESTANE (Aromasin) 25 MG TAB: 25 MG PO QDAY 30 Days #30         Instructions: Take after a meal.      * DULoxetine (Cymbalta) 60 MG CAPSULE.DR: 60 MG PO QDAY 30 Days #30      New Diagnostics      * CBC With Auto Diff, 2 Months         Dx: Breast cancer - C50.919      * CMP Comp Metabolic Panel, 2 Months         Dx: Breast cancer - C50.919      * B12      Dx: Breast cancer - C50.919      Plan/Instructions      She will restart Tamoxifen. Refills sent to pharmacy.             She stopped Exemestane in May of 2020.             Refill Duloxetine 60 mg PO QD with refills.             Start oral B-12, 1000 mcg, PO QD.             Recheck CBC, CMP< folate,B-12 level in 2 months.             Follow up in office in 2 months with NP.            Call if any questions or concerns.              * Plan Of Care: ()            * Chronic conditions reviewed and taken into consideration for today's  treatment       plan.      * Patient instructed to seek medical attention urgently for new or worsening       symptoms.      * Patient was educated/instructed on their diagnosis, treatment and medications       prior to discharge from the clinic today.      Codes:  Phone Eval 11-20 mi [id number]            Copies To:      Ava Li            Electronically signed by SHONNA PEDRAZA onc  06/09/2020 10:40       Disclaimer: Converted document may not contain table formatting or lab diagrams. Please see [employer] [facility] for the authenticated document.

## 2021-05-28 NOTE — PROGRESS NOTES
Patient: BARBRA POWELL     Acct: [id number]     Report: #[id number]  UNIT #: [id number]     : 1970    Encounter Date:2020  PRIMARY CARE: Ava Li  ***Signed***  --------------------------------------------------------------------------------------------------------------------  NURSE INTAKE      Visit Type      Established Patient Visit            Chief Complaint      BREAST CA HERE FOR F/U            Referring Provider/Copies To      Primary Care Provider:  BARBRA HERNANDEZ            History and Present Illness      Past Oncology Illness History      This is a very pleasant 46-year-old female who presents to cancer [facility]     for further treatment of left breast cancer.            1) Left breast Cancer: dx US bx: (16): 2.4 x 2.5 cm at 8:00 position.     Invasive ductal carcinoma: ER/IA+, Her 2neu 2+ by IHC. Her 2neu positive on     final surgical path.             Treatment:             1)Neoadjuvant chemotherapy AC x 4 cycles completed. (16)      2)Paclitaxel x 12 cycles completed. (3/22/17)      3)Breast MRI: 2 cm x 1 cm irregular mass in the inf. medial left breast with     invasion of the left pectoralis major muscle. Minimal residual foci of type 1 /     2 enhancement. (3/29/17).      4)Left breast mastectomy. final path: 2.5x1.1x1.1 cm mass. Tumor extension into     the skeletal muscle. No DCIS. No dermal lymphatic invasion. Tumor comes within     1.9 mm of deep margin. 3.5 cm of medial margin and 10 cm of lateral margin.     [address] x 4 are (-). ER+ (95%), IA+ (80%), Ki67 2%, Her 2neu 3+. (17).      5). Completed Docetaxel / Carboplatin/Herceptin. (17 - 17).      6) PET scan: left chest wall activity of mastectomy site. (3/14/18).       7) CT CAP: no evidence of metastatic disease. coarse subpleural interstitial     thickening of the ant. left upper lobe / lingular comp. with radiation fibrosis.    (18).      8) Completed 12 cycles of Herceptin.  (9/26/18).       9) Radiation to the left chest wall. (11/27/17 - 1/8/18)      10) Right breast pain. Right breast US was normal. (2/9/19).      11) MRI of right breast. No signs of malignancy. (3/2019)/      12) Discontinue Tamoxifen. Start Aromasin. Check menopausal status: (6/14/19.      13) Tolerating Aromasin. (7/12/19).      14) Mammogram: benign. (9/30/19) Alternate breast MRI right breast every 6     months for dense breasts.       15) maintenance Aromasin. (1/9/20)      16) Switched back to Tamoxifen due to severe arthraliga's secondary to Aromasin:    (5/2020)      17) Started Duloxetine after discontinuation of Exemestane for arthralgia's.     (6/09/20)      18) Taking Duloxetine and Tamoxifen. Tolerating well. (8/6/20)            HPI - Oncology Interim      1) Left breast Cancer: dx US bx: (8/17/16): 2.4 x 2.5 cm at 8:00 position.     Invasive ductal carcinoma: ER/RI+, Her 2neu 2+ by IHC. Her 2neu positive on     final surgical path.             Status post neoadjuvant chemotherapy AC, Paclitaxel, (3/22/2017), left breast     mastectomy: 1) Left breast Cancer: dx US bx: (8/17/16): 2.4 x 2.5 cm at 8:00     position. Invasive ductal carcinoma: ER/RI+, Her 2neu 2+ by IHC. Her 2neu     positive on final surgical path.        (5/11/2017), adjuvant Carbo/Docetaxel, 1 year of Herceptin completed on     (9/29/2017) and radiation to the left chest wall completed: (1/8/2018):     Endocrine therapy:  Started Tamoxifen in 2018-stopped due to hot flashes,     Exemestane: stopped due to severe arthralgia's,  Restarted Tamoxifenin June 2020.             Ms. Enriqueta Massey presents for 2 month follow up to see how arthralgia's are     doing after switching back to Tamoxifen in June. In addition, she is now taking     Duloxetine 60 mg PO QD for the arthralgia's. She reports she can tell when she     does not take Duloxetine and the joint pains return. In addition, she is     tolerating the Tamoxifen better now than  previously.             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             She is alternating mammograms with  breast MRI every 6 months secondary to dense    breast tissue. Right breast mammogram benign in April. She is due for MRI breast    in October 2020.             She denies any persistent headaches, cough, SOA or bone or joint pain. Previous     bone scan in negative in June 2020 when she had been having the worsening     arthralgia's.             She denies any secondary side effects associated with PN or cardiac toxicities     associated with previous chemotherapy.             2) Arthralgia's: Tolerating Duloxetine and feels this is helping her joint pain.            Treatments      Chemotherapy      Treatment History:             October 2016 - March 2017:  Adriamycin, Cytoxan x 4 cycles followed by 12 cycles    of Paclitaxel. .      May 11, 2017. Left mastectomy with tumor extension into skeletal muscle.with     deep margins. SLB negative.  no DCIS. Final path is ER 95%, NJ 80%, Her2 saul 3+.          July [id number] 29, 2017: Received 4 cycles of Docetaxel, Carboplatin and    Herceptin.       October 20,2017 - June 27, 2018 Completed 12 cycles of Herceptin.       November 27, 2017 - January 8, 2018: Radiation to the left chest wall x 24     treatment.       August 2018: CT CAP: No evidence of new metastatic disewse3 in the CAP. No new     osseous metastatic disease. Progression in patchy ground glass opacities and     coarse subpleural interstitial thickening in the anterior left upper lobe and     lingular most compatible with radiation fibrosis.       August 2018: Tamoxifen 20 mg daily started.       September 6, 2018. Benign right mammogram.            Clinical Trial Participant      No            ECOG Performance Status      0            PAST, FAMILY   Past Medical History      Past Medical History:  Diabetes  Type 2, Thyroid Disease      Hematology/Oncology (F):  Breast Cancer            Past Surgical History      Biopsy, Mastectomy Left, VAD Placement            Family History      Family History:  No Family History            Social History      Marital Status:        Lives independently:  Yes      Number of Children:  2      Occupation:  [employer] RN            Tobacco Use      Tobacco status:  Former smoker      Smoking packs/day:  1      Smoking history:  10-25 pack years      Quit status:  Quit date established            Alcohol Use      Alcohol intake:  None            Substance Use      Substance use:  Denies use            REVIEW OF SYSTEMS      General:  Admits: Fatigue;          Denies: Appetite Change, Fever, Night Sweats, Weight Gain, Weight Loss      Eye:  Denies Blurred Vision, Denies Corrective Lenses, Denies Diplopia, Denies     Vision Changes      ENT:  Denies Headache, Denies Hearing Loss, Denies Hoarseness, Denies Sore     Throat      Cardiovascular:  Denies Chest Pain, Denies Palpitations      Respiratory:  Denies: Cough, Coughing Blood, Productive Cough, Shortness of Air,    Wheezing      Gastrointestinal:  Denies Bloody Stools, Denies Constipation, Denies Diarrhea,     Denies Nausea/Vomiting, Denies Problem Swallowing, Denies Unable to Control     Bowels      Musculoskeletal:  Denies Back Pain, Denies Muscle Pain, Denies Painful Joints      Integumentary:  Denies Itching, Denies Lesions, Denies Rash      Neurologic:  Denies Dizziness, Denies Numbness\Tingling, Denies Seizures      Psychiatric:  Denies Anxiety, Denies Depression      Endocrine:  Denies Cold Intolerance, Denies Heat Intolerance      Hematologic/Lymphatic:  Denies Bruising, Denies Bleeding, Denies Enlarged Lymph     Nodes      Reproductive:  Admits: Menopause;          Denies: Heavy Periods, Pregnant, Still Menstruating            VITAL SIGNS AND SCORES      Vitals      Height 5 ft 0.98 in / 154.89 cm      Weight 160 lbs 11.445 oz  / 72.9 kg      BSA 1.72 m2      BMI 30.4 kg/m2      Temperature 98.1 F / 36.72 C - Temporal      Pulse 85      Respirations 16      Blood Pressure 125/79 Sitting, Right Arm      Pulse Oximetry 98%, ROOM AIR            Pain Score      Experiencing any pain?:  No      Pain Scale Used:  Numerical      Pain Intensity:  0            Fatigue Score      Experiencing any fatigue?:  Yes      Fatigue (0-10 scale):  5            PT/OT Functional Screening      No needs identified            Speech Functional Screening      No needs identified            Rehab to be Ordered      Type of Referral to be Ordered:  No needs identified            EXAM      Other      General appearance:  in no apparent distress, cooperative, appears stated age.      HEENT: No pallor, no icterus, oral mucosa moist      Neck: Supple, trachea central-not deviated      Lymph nodes: none palpable peripherally      Cardiovascular: S1-S2 heard, no murmurs, no rubs, no gallops.      Breast exam:      Respiratory: Clear to auscultation bilaterally, no adventitious sounds      Abdomen/gastro: Soft, nontender, no palpable hepatosplenomegaly, bowel sounds     heard      Skin: No lesions, no rashes, no petechiae.      Extremities: No pedal edema, peripheral pulses felt, no clubbing      Musculoskeletal: Normal tone, no rigidity of muscles; range of motion intact      Spine: No deformities      Psychiatric: Alert and oriented x3, appropriate affect, intact judgment      Neurologic: Cranial nerves II through XII grossly intact, no gross neurological     deficits noted.      EXAM      General appearance:  in no apparent distress, cooperative, appears stated age.      HEENT: No pallor, no icterus, oral mucosa moist      Neck: Supple, trachea central-not deviated      Lymph nodes: none palpable peripherally      Cardiovascular: S1-S2 heard, no murmurs, no rubs, no gallops.      Breast exam:      Respiratory: Clear to auscultation bilaterally, no adventitious sounds       Abdomen/gastro: Soft, nontender, no palpable hepatosplenomegaly, bowel sounds     heard      Skin: No lesions, no rashes, no petechiae.      Extremities: No pedal edema, peripheral pulses felt, no clubbing      Musculoskeletal: Normal tone, no rigidity of muscles; range of motion intact      Spine: No deformities      Psychiatric: Alert and oriented x3, appropriate affect, intact judgment      Neurologic: Cranial nerves II through XII grossly intact, no gross neurological     deficits noted.            PREVENTION      Hx Influenza Vaccination:  Yes      Date Influenza Vaccine Given:  Oct 1, 2019      Influenza Vaccine Declined:  No      2 or More Falls in Past Year?:  No      Fall Past Year with Injury?:  No      Hx Pneumococcal Vaccination:  No      Encouraged to follow-up with:  PCP regarding preventative exams.      Chart initiated by      LEEROY DAHL            ALLERGY/MEDS      Allergies      Coded Allergies:             MORPHINE (Verified  Allergy, Severe, ANXIETY, 6/9/20)           ADHESIVE TAPE (Verified  Allergy, Mild, RASH, 6/9/20)           SULFA (SULFONAMIDE ANTIBIOTICS) (Verified  Allergy, Mild, RASH, 6/9/20)            Medications      Last Reconciled on 8/6/20 15:36 by SHONNA PEDRAZA      DULoxetine (Cymbalta) 60 Mg Capsule.dr      60 MG PO QDAY, #90 CAP 5 Refills         Prov: SHONNA PEDRAZA         8/6/20       Tamoxifen Citrate (Tamoxifen*) 20 Mg Tab      20 MG PO QDAY, #90 TAB 2 Refills         Prov: SHONNA PEDRAZA         8/6/20       metFORMIN ER (metFORMIN ER) 500 Mg Tab.er.24      850 MG PO BID, #30 TAB.ER 0 Refills         Prov: SHONNA PEDRAZA         8/6/20       Cetirizine Hcl (zyrTEC) 10 Mg Tablet      10 MG PO QDAY, #30 TAB 0 Refills         Reported         10/11/19       Ondansetron Hcl (ONDANSETRON HCL) 4 Mg Tablet      8 MG PO TID for nausea for 30 Days, #60 TAB 0 Refills         Prov: SHONNA PEDRAZA         7/12/19       Cholecalciferol  (Vitamin D3*) 2,000 U Tablet      2000 UNITS PO 2X/WEEK, #30 TAB 0 Refills         Reported         6/6/18       Omega-3 Fatty Acids/Fish Oil (Fish Oil 1000 Mg) 1 Each Capsule      1 GM PO HS, #30 CAP 0 Refills         Reported         6/6/18       SITagliptin (Januvia) 50 Mg Tablet      100 MG PO QDAY, TAB         Reported         10/4/16       Levothyroxine (Synthroid) 0.05 Mg Tablet      50 MCG PO QAM, #90 TAB 0 Refills         Prov: *TABATHA SUMNER [facility]         2/17/11      Medications Reviewed:  No Changes made to meds            IMPRESSION/PLAN      Impression      1) Left breast Cancer: dx US bx: (8/17/16): 2.4 x 2.5 cm at 8:00 position.     Invasive ductal carcinoma: ER/PA+, Her 2neu 2+ by IHC. Her 2neu positive on     final surgical path.             Status post neoadjuvant chemotherapy AC, Paclitaxel, (3/22/2017), left breast     mastectomy: 1) Left breast Cancer: dx US bx: (8/17/16): 2.4 x 2.5 cm at 8:00     position. Invasive ductal carcinoma: ER/PA+, Her 2neu 2+ by IHC. Her 2neu     positive on final surgical path.        (5/11/2017), adjuvant Carbo/Docetaxel, 1 year of Herceptin completed on     (9/29/2017) and radiation to the left chest wall completed: (1/8/2018):     Endocrine therapy:  Started Tamoxifen in 2018-stopped due to hot flashes,     Exemestane: stopped due to severe arthralgia's,  Restarted Tamoxifenin June 2020.             Ms. Enriqueta Massey presents for 2 month follow up to see how arthralgia's are     doing after switching back to Tamoxifen in June. In addition, she is now taking     Duloxetine 60 mg PO QD for the arthralgia's. She reports she can tell when she     does not take Duloxetine and the joint pains return. In addition, she is     tolerating the Tamoxifen better now than previously.             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             She is alternating mammograms with  breast MRI  every 6 months secondary to dense    breast tissue. Right breast mammogram benign in April. She is due for MRI breast    in October 2020.             She denies any persistent headaches, cough, SOA or bone or joint pain. Previous     bone scan in negative in June 2020 when she had been having the worsening     arthralgia's.             She denies any secondary side effects associated with PN or cardiac toxicities     associated with previous chemotherapy.             2) Arthralgia's: Tolerating Duloxetine and feels this is helping her joint pain.            Diagnosis      Breast cancer         Malignant neoplasm of lower-inner quadrant of left breast in female, estrogen       receptor positive         Breast location: lower inner quadrant of breast         Estrogen receptor status: positive         Patient sex: female         Laterality: left            Notes      Renewed Medications      * Tamoxifen Citrate (Tamoxifen*) 20 MG TAB: 20 MG PO QDAY #90      * DULoxetine (Cymbalta) 60 MG CAPSULE.DR: 60 MG PO QDAY #90      Changed Medications      * metFORMIN  MG TAB.ER.24:         From: 500 MG PO BID #30         To: 850 MG PO BID #30      New Diagnostics      * CBC With Auto Diff, Routine         Dx: Breast cancer - C50.919      * CMP Comp Metabolic Panel, Routine         Dx: Breast cancer - C50.919      * LDH, Routine         Dx: Breast cancer - C50.919      * B12      Dx: Breast cancer - C50.919      * Screening Mammo Right, 2 Months         Dx: Breast cancer - C50.919            Plan      1) Breast mammogram right breast due in October 2020. Alternating with breast     MRI due to dense breast tissue.             Continue Tamoxifen. Refills sent to pharmacy.             2) Continue Duloxetine. Refills sent.             Return in 6 months with NP follow up.             Labwork today: CBC, CMP, folate, B-12 today.             Call with any questions or concerns.            Pain      Pain Zero Today             Advanced Care Plan Discussion      Declines Discussion [id number]            Patient Education      Patient Education Provided:  Yes            Electronically signed by SHONNA PEDRAZA onc  08/06/2020 15:36       Disclaimer: Converted document may not contain table formatting or lab diagrams. Please see [employer] for the authenticated document.

## 2021-06-02 ENCOUNTER — TREATMENT (OUTPATIENT)
Dept: ONCOLOGY | Facility: HOSPITAL | Age: 51
End: 2021-06-02

## 2021-06-02 DIAGNOSIS — Z45.2 ENCOUNTER FOR CARE RELATED TO VASCULAR ACCESS PORT: Primary | ICD-10-CM

## 2021-06-02 PROBLEM — Z17.0 MALIGNANT NEOPLASM OF BREAST IN FEMALE, ESTROGEN RECEPTOR POSITIVE (HCC): Chronic | Status: ACTIVE | Noted: 2021-06-02

## 2021-06-02 PROBLEM — C50.919 MALIGNANT NEOPLASM OF BREAST IN FEMALE, ESTROGEN RECEPTOR POSITIVE: Chronic | Status: ACTIVE | Noted: 2021-06-02

## 2021-06-02 RX ORDER — HEPARIN SODIUM (PORCINE) LOCK FLUSH IV SOLN 100 UNIT/ML 100 UNIT/ML
500 SOLUTION INTRAVENOUS AS NEEDED
Status: CANCELLED | OUTPATIENT
Start: 2021-06-11

## 2021-06-02 RX ORDER — SODIUM CHLORIDE 0.9 % (FLUSH) 0.9 %
10 SYRINGE (ML) INJECTION AS NEEDED
Status: CANCELLED | OUTPATIENT
Start: 2021-06-11

## 2021-06-02 RX ORDER — HEPARIN SODIUM (PORCINE) LOCK FLUSH IV SOLN 100 UNIT/ML 100 UNIT/ML
300 SOLUTION INTRAVENOUS ONCE
Status: CANCELLED | OUTPATIENT
Start: 2021-06-11

## 2021-06-02 RX ORDER — SODIUM CHLORIDE 0.9 % (FLUSH) 0.9 %
20 SYRINGE (ML) INJECTION AS NEEDED
Status: CANCELLED | OUTPATIENT
Start: 2021-06-11

## 2021-06-11 ENCOUNTER — HOSPITAL ENCOUNTER (OUTPATIENT)
Dept: ONCOLOGY | Facility: HOSPITAL | Age: 51
Discharge: HOME | End: 2021-06-11
Admitting: NURSE PRACTITIONER

## 2021-06-11 DIAGNOSIS — Z45.2 ENCOUNTER FOR CARE RELATED TO VASCULAR ACCESS PORT: Primary | ICD-10-CM

## 2021-06-11 PROCEDURE — 25010000002 HEPARIN LOCK FLUSH PER 10 UNITS: Performed by: NURSE PRACTITIONER

## 2021-06-11 PROCEDURE — 96523 IRRIG DRUG DELIVERY DEVICE: CPT

## 2021-06-11 RX ORDER — HEPARIN SODIUM (PORCINE) LOCK FLUSH IV SOLN 100 UNIT/ML 100 UNIT/ML
500 SOLUTION INTRAVENOUS AS NEEDED
Status: DISCONTINUED | OUTPATIENT
Start: 2021-06-11 | End: 2021-06-12 | Stop reason: HOSPADM

## 2021-06-11 RX ORDER — SODIUM CHLORIDE 0.9 % (FLUSH) 0.9 %
20 SYRINGE (ML) INJECTION AS NEEDED
Status: DISCONTINUED | OUTPATIENT
Start: 2021-06-11 | End: 2021-06-12 | Stop reason: HOSPADM

## 2021-06-11 RX ORDER — SODIUM CHLORIDE 0.9 % (FLUSH) 0.9 %
10 SYRINGE (ML) INJECTION AS NEEDED
Status: CANCELLED | OUTPATIENT
Start: 2021-06-11

## 2021-06-11 RX ORDER — SODIUM CHLORIDE 0.9 % (FLUSH) 0.9 %
20 SYRINGE (ML) INJECTION AS NEEDED
Status: CANCELLED | OUTPATIENT
Start: 2021-06-11

## 2021-06-11 RX ORDER — HEPARIN SODIUM (PORCINE) LOCK FLUSH IV SOLN 100 UNIT/ML 100 UNIT/ML
300 SOLUTION INTRAVENOUS ONCE
Status: DISCONTINUED | OUTPATIENT
Start: 2021-06-11 | End: 2021-06-11

## 2021-06-11 RX ORDER — HEPARIN SODIUM (PORCINE) LOCK FLUSH IV SOLN 100 UNIT/ML 100 UNIT/ML
500 SOLUTION INTRAVENOUS AS NEEDED
Status: CANCELLED | OUTPATIENT
Start: 2021-06-11

## 2021-06-11 RX ORDER — HEPARIN SODIUM (PORCINE) LOCK FLUSH IV SOLN 100 UNIT/ML 100 UNIT/ML
300 SOLUTION INTRAVENOUS ONCE
Status: CANCELLED | OUTPATIENT
Start: 2021-06-11

## 2021-06-11 RX ADMIN — HEPARIN SODIUM (PORCINE) LOCK FLUSH IV SOLN 100 UNIT/ML 500 UNITS: 100 SOLUTION at 15:09

## 2021-06-11 RX ADMIN — SODIUM CHLORIDE, PRESERVATIVE FREE 20 ML: 5 INJECTION INTRAVENOUS at 15:07

## 2021-11-05 ENCOUNTER — TELEPHONE (OUTPATIENT)
Dept: ONCOLOGY | Facility: HOSPITAL | Age: 51
End: 2021-11-05

## 2021-11-05 NOTE — TELEPHONE ENCOUNTER
Caller: Enriqueta Massey    Relationship to patient: Self    Best call back number: [phone]    Chief complaint: PATIENT WANTED TO KNOW IF SHE COULD HAVE HER PORT FLUSHED ON 11-12-21 WITH HER FOLLOW UP APPT     Type of visit: POST FLUSH    Requested date: 11-12-21

## 2021-11-12 ENCOUNTER — OFFICE VISIT (OUTPATIENT)
Dept: ONCOLOGY | Facility: HOSPITAL | Age: 51
End: 2021-11-12

## 2021-11-12 ENCOUNTER — HOSPITAL ENCOUNTER (OUTPATIENT)
Dept: ONCOLOGY | Facility: HOSPITAL | Age: 51
Setting detail: INFUSION SERIES
Discharge: HOME | End: 2021-11-12

## 2021-11-12 VITALS
BODY MASS INDEX: 29.4 KG/M2 | DIASTOLIC BLOOD PRESSURE: 92 MMHG | WEIGHT: 155.6 LBS | SYSTOLIC BLOOD PRESSURE: 152 MMHG | RESPIRATION RATE: 16 BRPM | OXYGEN SATURATION: 97 % | HEART RATE: 89 BPM | TEMPERATURE: 97.5 F

## 2021-11-12 DIAGNOSIS — Z78.0 OSTEOPENIA AFTER MENOPAUSE: ICD-10-CM

## 2021-11-12 DIAGNOSIS — C50.912 MALIGNANT NEOPLASM OF LEFT BREAST IN FEMALE, ESTROGEN RECEPTOR POSITIVE, UNSPECIFIED SITE OF BREAST (HCC): ICD-10-CM

## 2021-11-12 DIAGNOSIS — Z45.2 ENCOUNTER FOR CARE RELATED TO VASCULAR ACCESS PORT: Primary | ICD-10-CM

## 2021-11-12 DIAGNOSIS — M25.50 ARTHRALGIA, UNSPECIFIED JOINT: ICD-10-CM

## 2021-11-12 DIAGNOSIS — Z17.0 MALIGNANT NEOPLASM OF LEFT BREAST IN FEMALE, ESTROGEN RECEPTOR POSITIVE, UNSPECIFIED SITE OF BREAST (HCC): Primary | ICD-10-CM

## 2021-11-12 DIAGNOSIS — Z17.0 MALIGNANT NEOPLASM OF LEFT BREAST IN FEMALE, ESTROGEN RECEPTOR POSITIVE, UNSPECIFIED SITE OF BREAST (HCC): ICD-10-CM

## 2021-11-12 DIAGNOSIS — C50.912 MALIGNANT NEOPLASM OF LEFT BREAST IN FEMALE, ESTROGEN RECEPTOR POSITIVE, UNSPECIFIED SITE OF BREAST (HCC): Primary | ICD-10-CM

## 2021-11-12 DIAGNOSIS — Z12.31 BREAST CANCER SCREENING BY MAMMOGRAM: ICD-10-CM

## 2021-11-12 DIAGNOSIS — M85.80 OSTEOPENIA AFTER MENOPAUSE: ICD-10-CM

## 2021-11-12 PROBLEM — J45.909 ASTHMA: Status: ACTIVE | Noted: 2021-11-12

## 2021-11-12 PROBLEM — R82.4 ACETONURIA: Status: ACTIVE | Noted: 2021-11-12

## 2021-11-12 PROBLEM — I10 ESSENTIAL HYPERTENSION: Status: ACTIVE | Noted: 2021-11-12

## 2021-11-12 PROBLEM — E11.65 HYPERGLYCEMIA DUE TO TYPE 2 DIABETES MELLITUS: Status: ACTIVE | Noted: 2021-11-12

## 2021-11-12 PROBLEM — R92.8 ABNORMAL FINDINGS ON DIAGNOSTIC IMAGING OF BREAST: Status: ACTIVE | Noted: 2021-11-12

## 2021-11-12 PROBLEM — E53.8 VITAMIN B12 DEFICIENCY (NON ANEMIC): Status: ACTIVE | Noted: 2021-11-12

## 2021-11-12 PROBLEM — E78.5 HYPERLIPIDEMIA: Status: ACTIVE | Noted: 2021-11-12

## 2021-11-12 PROBLEM — E03.9 HYPOTHYROIDISM: Status: ACTIVE | Noted: 2021-11-12

## 2021-11-12 PROBLEM — E55.9 VITAMIN D DEFICIENCY: Status: ACTIVE | Noted: 2021-11-12

## 2021-11-12 PROBLEM — E11.9 TYPE 2 DIABETES MELLITUS WITHOUT COMPLICATION: Status: ACTIVE | Noted: 2021-11-12

## 2021-11-12 LAB
ALBUMIN SERPL-MCNC: 4.25 G/DL (ref 3.5–5.2)
ALBUMIN/GLOB SERPL: 1.7 G/DL
ALP SERPL-CCNC: 86 U/L (ref 39–117)
ALT SERPL W P-5'-P-CCNC: 18 U/L (ref 1–33)
ANION GAP SERPL CALCULATED.3IONS-SCNC: 8.8 MMOL/L (ref 5–15)
AST SERPL-CCNC: 16 U/L (ref 1–32)
BASOPHILS # BLD AUTO: 0.04 10*3/MM3 (ref 0–0.2)
BASOPHILS NFR BLD AUTO: 0.7 % (ref 0–1.5)
BILIRUB SERPL-MCNC: 0.4 MG/DL (ref 0–1.2)
BUN SERPL-MCNC: 11 MG/DL (ref 6–20)
BUN/CREAT SERPL: 14.7 (ref 7–25)
CALCIUM SPEC-SCNC: 9.1 MG/DL (ref 8.6–10.5)
CHLORIDE SERPL-SCNC: 100 MMOL/L (ref 98–107)
CO2 SERPL-SCNC: 24.2 MMOL/L (ref 22–29)
CREAT SERPL-MCNC: 0.75 MG/DL (ref 0.57–1)
DEPRECATED RDW RBC AUTO: 38.9 FL (ref 37–54)
EOSINOPHIL # BLD AUTO: 0.26 10*3/MM3 (ref 0–0.4)
EOSINOPHIL NFR BLD AUTO: 4.4 % (ref 0.3–6.2)
ERYTHROCYTE [DISTWIDTH] IN BLOOD BY AUTOMATED COUNT: 11.9 % (ref 12.3–15.4)
GFR SERPL CREATININE-BSD FRML MDRD: 81 ML/MIN/1.73
GLOBULIN UR ELPH-MCNC: 2.5 GM/DL
GLUCOSE SERPL-MCNC: 433 MG/DL (ref 65–99)
HCT VFR BLD AUTO: 44 % (ref 34–46.6)
HGB BLD-MCNC: 15.6 G/DL (ref 12–15.9)
IMM GRANULOCYTES # BLD AUTO: 0.02 10*3/MM3 (ref 0–0.05)
IMM GRANULOCYTES NFR BLD AUTO: 0.3 % (ref 0–0.5)
LYMPHOCYTES # BLD AUTO: 1.4 10*3/MM3 (ref 0.7–3.1)
LYMPHOCYTES NFR BLD AUTO: 23.9 % (ref 19.6–45.3)
MCH RBC QN AUTO: 31.5 PG (ref 26.6–33)
MCHC RBC AUTO-ENTMCNC: 35.5 G/DL (ref 31.5–35.7)
MCV RBC AUTO: 88.9 FL (ref 79–97)
MONOCYTES # BLD AUTO: 0.5 10*3/MM3 (ref 0.1–0.9)
MONOCYTES NFR BLD AUTO: 8.5 % (ref 5–12)
NEUTROPHILS NFR BLD AUTO: 3.63 10*3/MM3 (ref 1.7–7)
NEUTROPHILS NFR BLD AUTO: 62.2 % (ref 42.7–76)
NRBC BLD AUTO-RTO: 0 /100 WBC (ref 0–0.2)
PLATELET # BLD AUTO: 126 10*3/MM3 (ref 140–450)
PMV BLD AUTO: 12.1 FL (ref 6–12)
POTASSIUM SERPL-SCNC: 3.8 MMOL/L (ref 3.5–5.2)
PROT SERPL-MCNC: 6.7 G/DL (ref 6–8.5)
RBC # BLD AUTO: 4.95 10*6/MM3 (ref 3.77–5.28)
SODIUM SERPL-SCNC: 133 MMOL/L (ref 136–145)
WBC # BLD AUTO: 5.85 10*3/MM3 (ref 3.4–10.8)

## 2021-11-12 PROCEDURE — 80053 COMPREHEN METABOLIC PANEL: CPT | Performed by: NURSE PRACTITIONER

## 2021-11-12 PROCEDURE — 85025 COMPLETE CBC W/AUTO DIFF WBC: CPT | Performed by: NURSE PRACTITIONER

## 2021-11-12 PROCEDURE — 25010000002 HEPARIN LOCK FLUSH PER 10 UNITS: Performed by: NURSE PRACTITIONER

## 2021-11-12 PROCEDURE — G0463 HOSPITAL OUTPT CLINIC VISIT: HCPCS | Performed by: NURSE PRACTITIONER

## 2021-11-12 PROCEDURE — 36591 DRAW BLOOD OFF VENOUS DEVICE: CPT

## 2021-11-12 PROCEDURE — 99214 OFFICE O/P EST MOD 30 MIN: CPT | Performed by: NURSE PRACTITIONER

## 2021-11-12 RX ORDER — SODIUM CHLORIDE 0.9 % (FLUSH) 0.9 %
20 SYRINGE (ML) INJECTION AS NEEDED
Status: DISCONTINUED | OUTPATIENT
Start: 2021-11-12 | End: 2021-11-13 | Stop reason: HOSPADM

## 2021-11-12 RX ORDER — HEPARIN SODIUM (PORCINE) LOCK FLUSH IV SOLN 100 UNIT/ML 100 UNIT/ML
500 SOLUTION INTRAVENOUS AS NEEDED
Status: DISCONTINUED | OUTPATIENT
Start: 2021-11-12 | End: 2021-11-13 | Stop reason: HOSPADM

## 2021-11-12 RX ORDER — SODIUM CHLORIDE 0.9 % (FLUSH) 0.9 %
20 SYRINGE (ML) INJECTION AS NEEDED
Status: CANCELLED | OUTPATIENT
Start: 2021-11-12

## 2021-11-12 RX ORDER — HEPARIN SODIUM (PORCINE) LOCK FLUSH IV SOLN 100 UNIT/ML 100 UNIT/ML
300 SOLUTION INTRAVENOUS ONCE
Status: CANCELLED | OUTPATIENT
Start: 2021-11-12

## 2021-11-12 RX ORDER — HEPARIN SODIUM (PORCINE) LOCK FLUSH IV SOLN 100 UNIT/ML 100 UNIT/ML
500 SOLUTION INTRAVENOUS AS NEEDED
Status: CANCELLED | OUTPATIENT
Start: 2021-11-12

## 2021-11-12 RX ORDER — TAMOXIFEN CITRATE 20 MG/1
20 TABLET ORAL DAILY
Qty: 90 TABLET | Refills: 3 | Status: SHIPPED | OUTPATIENT
Start: 2021-11-12 | End: 2022-03-22

## 2021-11-12 RX ORDER — AMOXICILLIN 875 MG/1
TABLET, COATED ORAL
COMMUNITY
Start: 2021-09-02 | End: 2021-11-12 | Stop reason: ALTCHOICE

## 2021-11-12 RX ORDER — LEVOTHYROXINE SODIUM 50 UG/1
TABLET ORAL
COMMUNITY
Start: 2021-09-22 | End: 2021-11-12 | Stop reason: ALTCHOICE

## 2021-11-12 RX ORDER — IBUPROFEN 800 MG/1
TABLET ORAL
COMMUNITY
Start: 2021-08-10 | End: 2023-03-22

## 2021-11-12 RX ORDER — ACETAMINOPHEN AND CODEINE PHOSPHATE 300; 30 MG/1; MG/1
TABLET ORAL
COMMUNITY
Start: 2021-08-10 | End: 2023-03-22

## 2021-11-12 RX ORDER — HYDROCODONE BITARTRATE AND ACETAMINOPHEN 7.5; 325 MG/1; MG/1
TABLET ORAL
COMMUNITY
Start: 2021-09-02 | End: 2021-12-10 | Stop reason: ALTCHOICE

## 2021-11-12 RX ORDER — SODIUM CHLORIDE 0.9 % (FLUSH) 0.9 %
10 SYRINGE (ML) INJECTION AS NEEDED
Status: CANCELLED | OUTPATIENT
Start: 2021-11-12

## 2021-11-12 RX ADMIN — SODIUM CHLORIDE, PRESERVATIVE FREE 20 ML: 5 INJECTION INTRAVENOUS at 12:23

## 2021-11-12 RX ADMIN — HEPARIN SODIUM (PORCINE) LOCK FLUSH IV SOLN 100 UNIT/ML 500 UNITS: 100 SOLUTION at 12:24

## 2021-11-12 NOTE — PROGRESS NOTES
Patient notified of Blood glucose of 433. Enriqueta reports she had a sugar donut before receiving lab draw today. She will adjust her diet. Normal kidney function. Na 133 today. Platelet count of 133,000. She is on Metformin.

## 2021-11-12 NOTE — PROGRESS NOTES
Chief Complaint  Breast Cancer (-fu)    Ava Li MD Movania, Jawed M, MD      Subjective          Enriqueta Massey presents to [facility] for 6 month follow up for breast cancer.     History of Present Illness   Ms. Enriqueta Massey presents for 6 month follow up for Stage III left sided breast cancer diagnosed in August of 2016. She completed chemotherapy / XRT and left bilateral mastectomy. She reports on  Surgery specimen with Dr. Tino Carmichael found 2 different cancers types and completed a second chemotherapy regimen as well as year of Herceptin. She finsished all of the treatment around July of 2018.  She was started on Tamoxifen around July or August of 2018. She switched to Exemestane but then transitioned back to Tamoxifen use secondary to side effects of Exemestane.     She presents today and would like to discuss right breast mastectomy and would like to do breast reconstruction. Dr. Valeriano Carmichael in [place] did the first mastectomy and would like to see him for the right mastectomy. He placed infusaport as well, and would like to have port taken out as well.     She reports she is tolerating Tamoxifen use well. She has been taking endocrine therapy since around August of 2018. Has mammogram and dexa scan scheduled for December.     She has left hip pain, and right rib pain currently. Right rib pain has been chronic. She has not had any imaging in quite some time. 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     Cancer Staging  Stage III A       Treatment intent: curative    Oncology/Hematology History    No history exists.   1) Left breast Cancer: dx US bx: (8/17/16): 2.4 x 2.5 cm at 8:00 position.     Invasive ductal carcinoma: ER/KY+, Her 2neu 2+ by IHC. Her 2neu positive on     final surgical path.             Treatment:             1)Neoadjuvant  chemotherapy AC x 4 cycles completed. (12/7/16)      2)Paclitaxel x 12 cycles completed. (3/22/17)      3)Breast MRI: 2 cm x 1 cm irregular mass in the inf. medial left breast with     invasion of the left pectoralis major muscle. Minimal residual foci of type 1 /     2 enhancement. (3/29/17).      4)Left breast mastectomy. final path: 2.5x1.1x1.1 cm mass. Tumor extension into     the skeletal muscle. No DCIS. No dermal lymphatic invasion. Tumor comes within     1.9 mm of deep margin. 3.5 cm of medial margin and 10 cm of lateral margin.     Elsie LN x 4 are (-). ER+ (95%), NJ+ (80%), Ki67 2%, Her 2neu 3+. (5/11/17).      5). Completed Docetaxel / Carboplatin/Herceptin. (7/25/17 - 9/29/17).      6) PET scan: left chest wall activity of mastectomy site. (3/14/18).       7) CT CAP: no evidence of metastatic disease. coarse subpleural interstitial     thickening of the ant. left upper lobe / lingular comp. with radiation fibrosis.    (8/7/18).      8) Completed 12 cycles of Herceptin. (9/26/18).       9) Radiation to the left chest wall. (11/27/17 - 1/8/18)      10) Right breast pain. Right breast US was normal. (2/9/19).      11) MRI of right breast. No signs of malignancy. (3/2019)/      12) Discontinue Tamoxifen. Start Aromasin. Check menopausal status: (6/14/19.      13) Tolerating Aromasin. (7/12/19).      14) Mammogram: benign. (9/30/19) Alternate breast MRI right breast every 6     months for dense breasts.       15) maintenance Aromasin. (1/9/20)      16) Switched back to Tamoxifen due to severe arthraliga's secondary to Aromasin:    (5/2020)      17) Started Duloxetine after discontinuation of Exemestane for arthralgia's.     (6/09/20)      18) Taking Duloxetine and Tamoxifen. Tolerating well. (8/6/20)      19) Right mammo: benign: (11/19/20)                 Review of Systems   Constitutional: Negative for activity change, appetite change, fatigue and unexpected weight loss.   HENT: Negative for congestion,  sore throat and swollen glands.    Eyes: Negative for blurred vision, photophobia and visual disturbance.   Respiratory: Negative for apnea, cough and shortness of breath.    Cardiovascular: Negative for chest pain and palpitations.   Gastrointestinal: Negative for abdominal distention, abdominal pain, constipation, diarrhea, nausea, vomiting and GERD.   Endocrine: Negative for cold intolerance and heat intolerance.   Genitourinary: Negative for amenorrhea, breast pain, dysuria and hematuria.   Musculoskeletal: Positive for arthralgias (left hip pain and right rib pain) and myalgias.   Skin: Negative for color change, dry skin and rash.   Allergic/Immunologic: Negative for environmental allergies.   Neurological: Negative for dizziness, weakness, headache and confusion.   Hematological: Does not bruise/bleed easily.   Psychiatric/Behavioral: Negative for agitation and sleep disturbance.       Current Outpatient Medications on File Prior to Visit   Medication Sig Dispense Refill   • acetaminophen-codeine (TYLENOL #3) 300-30 MG per tablet      • HYDROcodone-acetaminophen (NORCO) 7.5-325 MG per tablet      • ibuprofen (ADVIL,MOTRIN) 800 MG tablet      • levothyroxine (SYNTHROID, LEVOTHROID) 100 MCG tablet Take  by mouth Daily.     • metFORMIN (GLUCOPHAGE) 1000 MG tablet Take  by mouth 2 (Two) Times a Day With Meals.     • SITagliptin (JANUVIA) 100 MG tablet Take  by mouth Daily.     • triamcinolone (KENALOG) 0.1 % cream Apply  topically to the appropriate area as directed 3 (Three) Times a Day. 45 g 0   • Vitamin D, Cholecalciferol, (CHOLECALCIFEROL) 10 MCG (400 UNIT) tablet Take 400 Units by mouth Daily.     • [DISCONTINUED] tamoxifen (NOLVADEX) 10 MG tablet Take  by mouth 2 (Two) Times a Day.     • amoxicillin (AMOXIL) 875 MG tablet      • Euthyrox 50 MCG tablet      • metFORMIN (GLUCOPHAGE) 850 MG tablet        No current facility-administered medications on file prior to visit.       Allergies   Allergen Reactions    • Sulfa Antibiotics Rash and Unknown - Low Severity   • Fenofibrate Unknown - Low Severity     Past Medical History:   Diagnosis Date   • Breast cancer (HCC)    • Diabetes mellitus (HCC)    • Disease of thyroid gland    • Hypertension      Past Surgical History:   Procedure Laterality Date   •  SECTION     • MASTECTOMY Left      Social History     Socioeconomic History   • Marital status:    Tobacco Use   • Smoking status: Never Smoker   • Smokeless tobacco: Never Used   Substance and Sexual Activity   • Alcohol use: Yes     Comment: occasionally   • Drug use: Never     Family History   Problem Relation Age of Onset   • Hypertension Mother    • Hypertension Father    • Diabetes Paternal Grandfather      Immunization History   Administered Date(s) Administered   • COVID-19 (PFIZER) 2020, 2021   • Hepatitis B 2007, 2007, 2008   • Td 2007       Objective   Physical Exam  Vitals and nursing note reviewed.   Constitutional:       Appearance: Normal appearance. She is normal weight.   HENT:      Head: Normocephalic.      Nose: Nose normal.      Mouth/Throat:      Mouth: Mucous membranes are moist.   Eyes:      Conjunctiva/sclera: Conjunctivae normal.      Pupils: Pupils are equal, round, and reactive to light.   Cardiovascular:      Rate and Rhythm: Normal rate and regular rhythm.      Pulses: Normal pulses.      Heart sounds: Normal heart sounds.   Pulmonary:      Effort: Pulmonary effort is normal.      Breath sounds: Normal breath sounds.   Abdominal:      General: Bowel sounds are normal. There is no distension.      Palpations: Abdomen is soft.      Tenderness: There is no abdominal tenderness.   Musculoskeletal:         General: Normal range of motion.      Cervical back: Normal range of motion and neck supple.      Right lower leg: No edema.      Left lower leg: No edema.   Skin:     General: Skin is warm and dry.      Capillary Refill: Capillary refill takes  less than 2 seconds.   Neurological:      General: No focal deficit present.      Mental Status: She is alert and oriented to person, place, and time.      Motor: No weakness.   Psychiatric:         Mood and Affect: Mood normal.         Behavior: Behavior normal.         Thought Content: Thought content normal.         Judgment: Judgment normal.         Vitals:    11/12/21 1130   BP: 152/92   Pulse: 89   Resp: 16   Temp: 97.5 °F (36.4 °C)   SpO2: 97%   Weight: 70.6 kg (155 lb 9.6 oz)   PainSc: 0-No pain     ECOG score: 0         ECOG: (0) Fully Active - Able to Carry On All Pre-disease Performance Without Restriction  Fall Risk Assessment was completed, and patient is at low risk for falls.  PHQ-9 Total Score: 0       The patient is not  experiencing fatigue. Fatigue score: 0    PT/OT Functional Screening: PT fx screen: No needs identified  Speech Functional Screening: Speech fx screen: No needs identified  Rehab to be ordered: Rehab to be ordered: No needs identified        Result Review :   The following data was reviewed by: ERROL Richards on 11/12/2021:  Lab Results   Component Value Date    HGB 14.9 08/06/2020    HCT 43.7 08/06/2020    MCV 90.3 08/06/2020     08/06/2020    WBC 6.77 08/06/2020    NEUTROABS 3.75 08/06/2020    LYMPHSABS 2.06 08/06/2020    MONOSABS 0.61 08/06/2020    EOSABS 0.28 08/06/2020    BASOSABS 0.04 08/06/2020     Lab Results   Component Value Date    GLUCOSE 216 (H) 08/06/2020    BUN 3 (L) 08/06/2020    CREATININE 0.66 08/06/2020     08/06/2020    K 3.6 08/06/2020    CL 96 (L) 08/06/2020    CO2 21 (L) 08/06/2020    CALCIUM 8.8 08/06/2020    PROTEINTOT 6.7 08/06/2020    ALBUMIN 4.1 08/06/2020    BILITOT 0.35 08/06/2020    ALKPHOS 89 08/06/2020    AST 26 08/06/2020    ALT 24 08/06/2020          Assessment and Plan    Diagnoses and all orders for this visit:    1. Malignant neoplasm of left breast in female, estrogen receptor positive, unspecified site of breast (HCC)  (Primary)  -     tamoxifen (NOLVADEX) 20 MG chemo tablet; Take 1 tablet by mouth Daily.  Dispense: 90 tablet; Refill: 3  -     Ambulatory Referral to General Surgery  -     Ambulatory Referral to Plastic Surgery  -     NM Bone Scan Whole Body; Future  -     CBC & Differential; Future  -     Comprehensive Metabolic Panel; Future    2. Osteopenia after menopause    3. Breast cancer screening by mammogram    4. Arthralgia, unspecified joint  -     NM Bone Scan Whole Body; Future    Doing well in there interim, other than complaints of left hip pain. She has ongoing right rib as well. She has not had a bone scan in quite some time. Denies any cough, HA, SOA or abdominal pain. Has mammogram and dexa scan scheduled for December 2021. She is interested in having right mastectomy and bilateral breast reconstruction. She wants to see Dr. Valeriano Carmichael who did her first mastectomy and would like to have port taken out as well. She is interested seeing the new plastic surgery here locally for breast reconstruction.     We will check labs today and order a bone scan.     Refill Tamoxifen today.     Follow up in 3 weeks for bone scan results and lab results.       Patient was given instructions and counseling regarding her condition or for health maintenance advice. Please see specific information pulled into the AVS if appropriate.     Debora Varma, APRN    11/12/2021

## 2021-11-19 ENCOUNTER — HOSPITAL ENCOUNTER (OUTPATIENT)
Dept: BONE DENSITY | Facility: HOSPITAL | Age: 51
Discharge: HOME | End: 2021-11-19

## 2021-11-19 ENCOUNTER — HOSPITAL ENCOUNTER (OUTPATIENT)
Dept: MAMMOGRAPHY | Facility: HOSPITAL | Age: 51
Discharge: HOME | End: 2021-11-19

## 2021-11-19 DIAGNOSIS — Z78.0 POST-MENOPAUSAL: ICD-10-CM

## 2021-11-19 DIAGNOSIS — Z12.31 SCREENING MAMMOGRAM, ENCOUNTER FOR: ICD-10-CM

## 2021-11-19 PROCEDURE — 77080 DXA BONE DENSITY AXIAL: CPT

## 2021-11-19 PROCEDURE — 77063 BREAST TOMOSYNTHESIS BI: CPT

## 2021-11-19 PROCEDURE — 77067 SCR MAMMO BI INCL CAD: CPT

## 2021-11-19 NOTE — PROGRESS NOTES
"Consult (breast reconstruction)            History of Present Illness  Enriqueta Massey is a 51 y.o. female who presents to [facility] as a consult from  Cancer center in [place] to discuss breast reconstructive options. Left breast mastectomy 5/11/17.  Considering right breast mastectomy. Wants to discuss Recon for both breast . Wants right breast removed as well and implants placed. Has had chemo and then radiation to left breast.   Would like to do breast reconstruction with Dr. Valeriano Carmichael in [place]. He did the first mastectomy and would like to see him for the right mastectomy. He placed infusaport as well, and would like to have port taken out.  Recent right mammogram screening 11/19/21 was normal. Is diabetic and last A1C was 9 but will work on lowering it before surgery.   Subjective      Sulfa antibiotics and Fenofibrate  Allergies Reconciled.    Review of Systems     Objective     /90 (BP Location: Right arm, Patient Position: Sitting, Cuff Size: Adult)   Pulse 82   Temp 98.2 °F (36.8 °C) (Temporal)   Ht 154.9 cm (61\")   Wt 72.1 kg (159 lb)   SpO2 97%   BMI 30.04 kg/m²     Body mass index is 30.04 kg/m².    Physical Exam  Right breast intact, grade 2 nipple ptosis, no palpable masses or tenderness, left chest with well healed mastectomy scar, radiated skin present but in good condition    Result Review :              Assessment and Plan      Diagnoses and all orders for this visit:    1. Malignant neoplasm of left breast in female, estrogen receptor positive, unspecified site of breast (HCC) (Primary)        Additional Order(s):       Plan:  • Dr. Webb was present at visit and discussed Breast reconstruction options (Tissue Expander/Implant versus Autologous) were all discussed with the patient at length.  In addition, we discussed options for symmetry procedures and nipple reconstruction.  The patient understands that her " reconstructed breast will not have the same sensation as a natural breast and that visible incision lines will always be present.  In addition, patient understand that breast reconstruction is a multi-stage procedure that can take months to years to complete.   • Patient was given the breast reconstruction pamphlet as well as directed to the [employer] website for additional information.   • We had a long discussion with the patient today regarding her reconstructive options.  These include no reconstruction, reconstruction at the time of mastectomy, and finally delayed reconstruction.  We discussed specific types of reconstruction, including: tissue expander and/or implants, and autologous reconstruction with either pedicled or free flap.  Discussed Amada flap but patient would have to go to  or [facility]. Patient may get expanders inserted after right mastectomy then decide if she prefers Amada flap versus implants.   • We discussed that radiation therapy, it, may alter the reconstructive options.     • We reviewed surgical risks including, but not limited to, infection, bleeding, hematoma, seroma, pain, scarring, numbness, skin or nipple loss, wound healing problems, flap failures including partial or complete flap loss, asymmetry, need for revisions or further surgeries,  and risks associated with anesthesia.   • Additional risks with autologous reconstruction include donor wound morbidities, such as hernias or bulges, wound healing problems, muscle weakness, partial or complete flap loss, and typical surgical risks as listed above.   • Patient will think about her options and let us know how she wants to proceed.         Follow Up     No follow-ups on file.    Patient was given instructions and counseling regarding her condition. Please see specific information pulled into the AVS if appropriate.     Adenike Bal, APRN  11/24/2021

## 2021-11-24 ENCOUNTER — OFFICE VISIT (OUTPATIENT)
Dept: PLASTIC SURGERY | Facility: CLINIC | Age: 51
End: 2021-11-24

## 2021-11-24 VITALS
HEIGHT: 61 IN | BODY MASS INDEX: 30.02 KG/M2 | SYSTOLIC BLOOD PRESSURE: 136 MMHG | WEIGHT: 159 LBS | DIASTOLIC BLOOD PRESSURE: 90 MMHG | TEMPERATURE: 98.2 F | HEART RATE: 82 BPM | OXYGEN SATURATION: 97 %

## 2021-11-24 DIAGNOSIS — Z17.0 MALIGNANT NEOPLASM OF LEFT BREAST IN FEMALE, ESTROGEN RECEPTOR POSITIVE, UNSPECIFIED SITE OF BREAST (HCC): Primary | Chronic | ICD-10-CM

## 2021-11-24 DIAGNOSIS — C50.912 MALIGNANT NEOPLASM OF LEFT BREAST IN FEMALE, ESTROGEN RECEPTOR POSITIVE, UNSPECIFIED SITE OF BREAST (HCC): Primary | Chronic | ICD-10-CM

## 2021-11-24 PROCEDURE — 99214 OFFICE O/P EST MOD 30 MIN: CPT | Performed by: NURSE PRACTITIONER

## 2021-11-30 ENCOUNTER — HOSPITAL ENCOUNTER (OUTPATIENT)
Dept: NUCLEAR MEDICINE | Facility: HOSPITAL | Age: 51
Discharge: HOME | End: 2021-11-30

## 2021-11-30 DIAGNOSIS — C50.912 MALIGNANT NEOPLASM OF LEFT BREAST IN FEMALE, ESTROGEN RECEPTOR POSITIVE, UNSPECIFIED SITE OF BREAST (HCC): ICD-10-CM

## 2021-11-30 DIAGNOSIS — Z17.0 MALIGNANT NEOPLASM OF LEFT BREAST IN FEMALE, ESTROGEN RECEPTOR POSITIVE, UNSPECIFIED SITE OF BREAST (HCC): ICD-10-CM

## 2021-11-30 DIAGNOSIS — M25.50 ARTHRALGIA, UNSPECIFIED JOINT: ICD-10-CM

## 2021-11-30 PROCEDURE — 0 TECHNETIUM MEDRONATE KIT: Performed by: NURSE PRACTITIONER

## 2021-11-30 PROCEDURE — A9503 TC99M MEDRONATE: HCPCS | Performed by: NURSE PRACTITIONER

## 2021-11-30 PROCEDURE — 78306 BONE IMAGING WHOLE BODY: CPT

## 2021-11-30 RX ORDER — TC 99M MEDRONATE 20 MG/10ML
20.3 INJECTION, POWDER, LYOPHILIZED, FOR SOLUTION INTRAVENOUS
Status: COMPLETED | OUTPATIENT
Start: 2021-11-30 | End: 2021-11-30

## 2021-11-30 RX ADMIN — TC 99M MEDRONATE 20.3 MILLICURIE: 20 INJECTION, POWDER, LYOPHILIZED, FOR SOLUTION INTRAVENOUS at 13:22

## 2021-12-10 ENCOUNTER — OFFICE VISIT (OUTPATIENT)
Dept: ONCOLOGY | Facility: HOSPITAL | Age: 51
End: 2021-12-10

## 2021-12-10 VITALS
HEART RATE: 95 BPM | RESPIRATION RATE: 16 BRPM | OXYGEN SATURATION: 97 % | SYSTOLIC BLOOD PRESSURE: 133 MMHG | DIASTOLIC BLOOD PRESSURE: 74 MMHG | BODY MASS INDEX: 30.41 KG/M2 | WEIGHT: 160.94 LBS | TEMPERATURE: 97 F

## 2021-12-10 DIAGNOSIS — Z17.0 MALIGNANT NEOPLASM OF LEFT BREAST IN FEMALE, ESTROGEN RECEPTOR POSITIVE, UNSPECIFIED SITE OF BREAST (HCC): Chronic | ICD-10-CM

## 2021-12-10 DIAGNOSIS — N13.39 OTHER HYDRONEPHROSIS: Primary | ICD-10-CM

## 2021-12-10 DIAGNOSIS — C50.912 MALIGNANT NEOPLASM OF LEFT BREAST IN FEMALE, ESTROGEN RECEPTOR POSITIVE, UNSPECIFIED SITE OF BREAST (HCC): Chronic | ICD-10-CM

## 2021-12-10 PROCEDURE — G0463 HOSPITAL OUTPT CLINIC VISIT: HCPCS | Performed by: NURSE PRACTITIONER

## 2021-12-10 PROCEDURE — 99214 OFFICE O/P EST MOD 30 MIN: CPT | Performed by: NURSE PRACTITIONER

## 2021-12-10 RX ORDER — ERGOCALCIFEROL 1.25 MG/1
CAPSULE ORAL
COMMUNITY
Start: 2021-12-01

## 2021-12-10 RX ORDER — LEVOTHYROXINE SODIUM 50 UG/1
TABLET ORAL
COMMUNITY
Start: 2021-12-01 | End: 2023-03-22

## 2021-12-10 RX ORDER — ATORVASTATIN CALCIUM 20 MG/1
TABLET, FILM COATED ORAL
COMMUNITY
Start: 2021-12-01

## 2021-12-10 NOTE — PROGRESS NOTES
Chief Complaint  Breast Cancer (-fu)    Ava Li MD Movania, Jawed M, MD Subjective          Enriqueta Massey presents to [facility] for follow up for bone scan results.     History of Present Illness     Was last seen on 11/12/21 for 6 month follow up visit. History of stage IIIA breast cancer. At her last visit was having left hip and right rib pain. Bone scan was performed and is here to follow up on the results of this. Bone scan done on 12/1/21  showed no osseous metastatic disease present but does shows mild uptake within the right sholder in the hip joints as well as joints of the ankle and foot which can be seen with osteoarthritis.  It does shows some fullness within the right collecting system concerning for hydronephrosis. Suggest right renal US to be done.     Ms. Massey inquires about having her daughter's genetically tested for breast cancer to determine their risk. She had orders placed for genetic testing done in 2018 but do not see results.     She saw Dr. Pastor next door for plastic surgery for breast reconstruction. Dr. Pastor can not perform because she does not have priviledges at [facility]. She has follow up with Dr. Valeriano Carmichael next Friday 12/17/21 to discuss right mastectomy and will be having additional follow up with other plastic surgery for possible breast reconstruction.   Cancer Staging  No matching staging information was found for the patient.      Treatment intent: curative    Oncology/Hematology History    No history exists.   1) Left breast Cancer: dx US bx: (8/17/16): 2.4 x 2.5 cm at 8:00 position.     Invasive ductal carcinoma: ER/TX+, Her 2neu 2+ by IHC. Her 2neu positive on     final surgical path.             Treatment:             1)Neoadjuvant chemotherapy AC x 4 cycles completed. (12/7/16)      2)Paclitaxel x 12 cycles completed. (3/22/17)      3)Breast MRI: 2 cm x 1 cm irregular mass in the inf. medial left breast  with     invasion of the left pectoralis major muscle. Minimal residual foci of type 1 /     2 enhancement. (3/29/17).      4)Left breast mastectomy. final path: 2.5x1.1x1.1 cm mass. Tumor extension into     the skeletal muscle. No DCIS. No dermal lymphatic invasion. Tumor comes within     1.9 mm of deep margin. 3.5 cm of medial margin and 10 cm of lateral margin.     [address] x 4 are (-). ER+ (95%), [place]+ (80%), Ki67 2%, Her 2neu 3+. (5/11/17).      5). Completed Docetaxel / Carboplatin/Herceptin. (7/25/17 - 9/29/17).      6) PET scan: left chest wall activity of mastectomy site. (3/14/18).       7) CT CAP: no evidence of metastatic disease. coarse subpleural interstitial     thickening of the ant. left upper lobe / lingular comp. with radiation fibrosis.    (8/7/18).      8) Completed 12 cycles of Herceptin. (9/26/18).       9) Radiation to the left chest wall. (11/27/17 - 1/8/18)      10) Right breast pain. Right breast US was normal. (2/9/19).      11) MRI of right breast. No signs of malignancy. (3/2019)/      12) Discontinue Tamoxifen. Start Aromasin. Check menopausal status: (6/14/19.      13) Tolerating Aromasin. (7/12/19).      14) Mammogram: benign. (9/30/19) Alternate breast MRI right breast every 6     months for dense breasts.       15) maintenance Aromasin. (1/9/20)      16) Switched back to Tamoxifen due to severe arthraliga's secondary to Aromasin:    (5/2020)      17) Started Duloxetine after discontinuation of Exemestane for arthralgia's.     (6/09/20)      18) Taking Duloxetine and Tamoxifen. Tolerating well. (8/6/20)      19) Right mammo: benign: (11/19/20)        Review of Systems   Constitutional: Positive for fatigue. Negative for appetite change, diaphoresis, fever, unexpected weight gain and unexpected weight loss.   HENT: Negative for hearing loss, mouth sores, sore throat, swollen glands, trouble swallowing and voice change.    Eyes: Negative for blurred vision.   Respiratory: Negative  for cough, shortness of breath and wheezing.    Cardiovascular: Negative for chest pain and palpitations.   Gastrointestinal: Negative for abdominal pain, blood in stool, constipation, diarrhea, nausea and vomiting.   Endocrine: Negative for cold intolerance and heat intolerance.   Genitourinary: Negative for difficulty urinating, dysuria, frequency, hematuria and urinary incontinence.   Musculoskeletal: Negative for arthralgias, back pain and myalgias.   Skin: Negative for rash, skin lesions and bruise.   Neurological: Negative for dizziness, seizures, weakness, numbness and headache.   Hematological: Does not bruise/bleed easily.   Psychiatric/Behavioral: Negative for depressed mood. The patient is not nervous/anxious.    All other systems reviewed and are negative.      Current Outpatient Medications on File Prior to Visit   Medication Sig Dispense Refill   • acetaminophen-codeine (TYLENOL #3) 300-30 MG per tablet      • atorvastatin (LIPITOR) 20 MG tablet      • Euthyrox 50 MCG tablet      • ibuprofen (ADVIL,MOTRIN) 800 MG tablet      • levothyroxine (SYNTHROID, LEVOTHROID) 100 MCG tablet Take  by mouth Daily.     • SITagliptin-metFORMIN HCl ER (JANUMET XR) 100-1000 MG tablet 1 tab(s)     • tamoxifen (NOLVADEX) 20 MG chemo tablet Take 1 tablet by mouth Daily. 90 tablet 3   • triamcinolone (KENALOG) 0.1 % cream Apply  topically to the appropriate area as directed 3 (Three) Times a Day. 45 g 0   • vitamin D (ERGOCALCIFEROL) 1.25 MG (79207 UT) capsule capsule      • Vitamin D, Cholecalciferol, (CHOLECALCIFEROL) 10 MCG (400 UNIT) tablet Take 400 Units by mouth Daily.     • [DISCONTINUED] HYDROcodone-acetaminophen (NORCO) 7.5-325 MG per tablet      • [DISCONTINUED] metFORMIN (GLUCOPHAGE) 1000 MG tablet Take  by mouth 2 (Two) Times a Day With Meals.     • [DISCONTINUED] SITagliptin (JANUVIA) 100 MG tablet Take  by mouth Daily.       No current facility-administered medications on file prior to visit.       Allergies    Allergen Reactions   • Sulfa Antibiotics Rash and Unknown - Low Severity   • Fenofibrate Unknown - Low Severity     Past Medical History:   Diagnosis Date   • Breast cancer (HCC)    • Diabetes mellitus (HCC)    • Disease of thyroid gland    • Hypertension      Past Surgical History:   Procedure Laterality Date   •  SECTION     • MASTECTOMY Left      Social History     Socioeconomic History   • Marital status: Legally    Tobacco Use   • Smoking status: Former Smoker     Types: Cigarettes   • Smokeless tobacco: Never Used   • Tobacco comment: N/A   Vaping Use   • Vaping Use: Never used   Substance and Sexual Activity   • Alcohol use: Yes     Comment: occasionally   • Drug use: Never     Family History   Problem Relation Age of Onset   • Hypertension Mother    • Hypertension Father    • Diabetes Paternal Grandfather      Immunization History   Administered Date(s) Administered   • COVID-19 (PFIZER) 2020, 2020, 2021, 2021   • Hepatitis B 2007, 2007, 2008   • Td 2007       Objective   Physical Exam  Vitals and nursing note reviewed.   Constitutional:       Appearance: Normal appearance.   HENT:      Head: Normocephalic.      Nose: Nose normal.      Mouth/Throat:      Mouth: Mucous membranes are moist.   Eyes:      Conjunctiva/sclera: Conjunctivae normal.      Pupils: Pupils are equal, round, and reactive to light.   Cardiovascular:      Rate and Rhythm: Normal rate and regular rhythm.      Pulses: Normal pulses.      Heart sounds: Normal heart sounds. No murmur heard.      Pulmonary:      Effort: Pulmonary effort is normal. No respiratory distress.      Breath sounds: Normal breath sounds.   Abdominal:      General: Bowel sounds are normal. There is no distension.      Palpations: Abdomen is soft.   Musculoskeletal:         General: Normal range of motion.      Cervical back: Normal range of motion and neck supple.   Skin:     General: Skin is warm and  dry.      Capillary Refill: Capillary refill takes less than 2 seconds.   Neurological:      General: No focal deficit present.      Mental Status: She is alert and oriented to person, place, and time.   Psychiatric:         Mood and Affect: Mood normal.         Behavior: Behavior normal.         Thought Content: Thought content normal.         Judgment: Judgment normal.         Vitals:    12/10/21 1136   BP: 133/74   Pulse: 95   Resp: 16   Temp: 97 °F (36.1 °C)   SpO2: 97%   Weight: 73 kg (160 lb 15 oz)   PainSc: 0-No pain     ECOG score: 0         ECOG: (0) Fully Active - Able to Carry On All Pre-disease Performance Without Restriction  Fall Risk Assessment was completed, and patient is at low risk for falls.  PHQ-9 Total Score: 0       The patient is  experiencing fatigue. Fatigue score: 3    PT/OT Functional Screening: PT fx screen: No needs identified  Speech Functional Screening: Speech fx screen: No needs identified  Rehab to be ordered: Rehab to be ordered: No needs identified        Result Review :   The following data was reviewed by: ERROL Richards on 12/10/2021:  Lab Results   Component Value Date    HGB 15.6 11/12/2021    HCT 44.0 11/12/2021    MCV 88.9 11/12/2021     (L) 11/12/2021    WBC 5.85 11/12/2021    NEUTROABS 3.63 11/12/2021    LYMPHSABS 1.40 11/12/2021    MONOSABS 0.50 11/12/2021    EOSABS 0.26 11/12/2021    BASOSABS 0.04 11/12/2021     Lab Results   Component Value Date    GLUCOSE 433 (C) 11/12/2021    BUN 11 11/12/2021    CREATININE 0.75 11/12/2021     (L) 11/12/2021    K 3.8 11/12/2021     11/12/2021    CO2 24.2 11/12/2021    CALCIUM 9.1 11/12/2021    PROTEINTOT 6.7 11/12/2021    ALBUMIN 4.25 11/12/2021    BILITOT 0.4 11/12/2021    ALKPHOS 86 11/12/2021    AST 16 11/12/2021    ALT 18 11/12/2021       Imaging: Bone scan 12/1/21.     CONCLUSION:   1. No evidence of osseous metastatic disease  2. Prominence of the right collecting system, more prominent than on  prior.  Recommend correlation   with renal ultrasound to evaluate for hydronephrosis.       Assessment and Plan    Diagnoses and all orders for this visit:    1. Other hydronephrosis (Primary)  -     US Renal Limited; Future    2. Malignant neoplasm of left breast in female, estrogen receptor positive, unspecified site of breast (HCC)    Discussed bone scan results. Right renal US ordered to evaluate for right hydronephrosis. Inquires about genetic testing for daughters to be genetic tested for breast cancer. Will have to find genetic testing of patient first if done. There was an order placed but do not see genetic testing report.     Continue Tamoxifen.     Continue follow up with Dr. Valeriano Carmichael at [facility] for right mastectomy and port removal.           Patient Follow Up: 6 months with NP.     Patient was given instructions and counseling regarding her condition or for health maintenance advice. Please see specific information pulled into the AVS if appropriate.     Debora Varma, APRN    12/10/2021

## 2021-12-14 DIAGNOSIS — C50.919 MALIGNANT NEOPLASM OF BREAST IN FEMALE, ESTROGEN RECEPTOR POSITIVE, UNSPECIFIED LATERALITY, UNSPECIFIED SITE OF BREAST (HCC): Primary | ICD-10-CM

## 2021-12-14 DIAGNOSIS — Z17.0 MALIGNANT NEOPLASM OF BREAST IN FEMALE, ESTROGEN RECEPTOR POSITIVE, UNSPECIFIED LATERALITY, UNSPECIFIED SITE OF BREAST (HCC): Primary | ICD-10-CM

## 2022-01-13 ENCOUNTER — HOSPITAL ENCOUNTER (OUTPATIENT)
Dept: ULTRASOUND IMAGING | Facility: HOSPITAL | Age: 52
Discharge: HOME | End: 2022-01-13
Admitting: NURSE PRACTITIONER

## 2022-01-13 DIAGNOSIS — N13.39 OTHER HYDRONEPHROSIS: ICD-10-CM

## 2022-01-13 PROCEDURE — 76775 US EXAM ABDO BACK WALL LIM: CPT

## 2022-01-14 DIAGNOSIS — N13.30 HYDRONEPHROSIS, UNSPECIFIED HYDRONEPHROSIS TYPE: Primary | ICD-10-CM

## 2022-01-14 NOTE — PROGRESS NOTES
Renal US noted with recommendation to do non-contrast CT of abdomen and pelvis. Called to speak with Enriqueta regarding results and need for more testing.     CT ordered and referral to see nephrology for further evaluation.

## 2022-01-17 ENCOUNTER — TRANSCRIBE ORDERS (OUTPATIENT)
Dept: ADMINISTRATIVE | Facility: HOSPITAL | Age: 52
End: 2022-01-17

## 2022-01-17 DIAGNOSIS — N13.30 HYDRONEPHROSIS, UNSPECIFIED HYDRONEPHROSIS TYPE: Primary | ICD-10-CM

## 2022-01-21 ENCOUNTER — HOSPITAL ENCOUNTER (OUTPATIENT)
Dept: CT IMAGING | Facility: HOSPITAL | Age: 52
End: 2022-01-21

## 2022-01-28 ENCOUNTER — APPOINTMENT (OUTPATIENT)
Dept: CT IMAGING | Facility: HOSPITAL | Age: 52
End: 2022-01-28

## 2022-02-04 ENCOUNTER — HOSPITAL ENCOUNTER (OUTPATIENT)
Dept: CT IMAGING | Facility: HOSPITAL | Age: 52
Discharge: HOME | End: 2022-02-04
Admitting: NURSE PRACTITIONER

## 2022-02-04 DIAGNOSIS — N13.30 HYDRONEPHROSIS, UNSPECIFIED HYDRONEPHROSIS TYPE: ICD-10-CM

## 2022-02-04 PROCEDURE — 74176 CT ABD & PELVIS W/O CONTRAST: CPT

## 2022-02-09 NOTE — PROGRESS NOTES
Called to discuss CT results and the previous hydronephrosis.     No new findings. May have passed a stone.     Called to discuss with patient. Went to voice mail.

## 2022-03-09 ENCOUNTER — TRANSCRIBE ORDERS (OUTPATIENT)
Dept: LAB | Facility: HOSPITAL | Age: 52
End: 2022-03-09

## 2022-03-09 ENCOUNTER — LAB (OUTPATIENT)
Dept: LAB | Facility: HOSPITAL | Age: 52
End: 2022-03-09

## 2022-03-09 DIAGNOSIS — E11.9 DIABETES MELLITUS WITHOUT COMPLICATION: ICD-10-CM

## 2022-03-09 DIAGNOSIS — N20.0 KIDNEY STONE: ICD-10-CM

## 2022-03-09 DIAGNOSIS — N20.0 KIDNEY STONE: Primary | ICD-10-CM

## 2022-03-09 DIAGNOSIS — E55.9 VITAMIN D DEFICIENCY: ICD-10-CM

## 2022-03-09 LAB
ALBUMIN SERPL-MCNC: 4.5 G/DL (ref 3.5–5.2)
ALBUMIN/GLOB SERPL: 1.9 G/DL
ALP SERPL-CCNC: 65 U/L (ref 39–117)
ALT SERPL W P-5'-P-CCNC: 48 U/L (ref 1–33)
ANION GAP SERPL CALCULATED.3IONS-SCNC: 11.8 MMOL/L (ref 5–15)
AST SERPL-CCNC: 55 U/L (ref 1–32)
BASOPHILS # BLD AUTO: 0.04 10*3/MM3 (ref 0–0.2)
BASOPHILS NFR BLD AUTO: 0.7 % (ref 0–1.5)
BILIRUB SERPL-MCNC: 0.5 MG/DL (ref 0–1.2)
BILIRUB UR QL STRIP: NEGATIVE
BUN SERPL-MCNC: 8 MG/DL (ref 6–20)
BUN/CREAT SERPL: 12.5 (ref 7–25)
CALCIUM SPEC-SCNC: 9.2 MG/DL (ref 8.6–10.5)
CHLORIDE SERPL-SCNC: 100 MMOL/L (ref 98–107)
CLARITY UR: CLEAR
CO2 SERPL-SCNC: 26.2 MMOL/L (ref 22–29)
COLOR UR: YELLOW
CREAT SERPL-MCNC: 0.64 MG/DL (ref 0.57–1)
DEPRECATED RDW RBC AUTO: 40.5 FL (ref 37–54)
EGFRCR SERPLBLD CKD-EPI 2021: 107.1 ML/MIN/1.73
EOSINOPHIL # BLD AUTO: 0.43 10*3/MM3 (ref 0–0.4)
EOSINOPHIL NFR BLD AUTO: 7.4 % (ref 0.3–6.2)
ERYTHROCYTE [DISTWIDTH] IN BLOOD BY AUTOMATED COUNT: 12.5 % (ref 12.3–15.4)
GLOBULIN UR ELPH-MCNC: 2.4 GM/DL
GLUCOSE SERPL-MCNC: 214 MG/DL (ref 65–99)
GLUCOSE UR STRIP-MCNC: ABNORMAL MG/DL
HBA1C MFR BLD: 11.2 % (ref 4.8–5.6)
HCT VFR BLD AUTO: 42.2 % (ref 34–46.6)
HGB BLD-MCNC: 14.4 G/DL (ref 12–15.9)
HGB UR QL STRIP.AUTO: NEGATIVE
IMM GRANULOCYTES # BLD AUTO: 0.01 10*3/MM3 (ref 0–0.05)
IMM GRANULOCYTES NFR BLD AUTO: 0.2 % (ref 0–0.5)
KETONES UR QL STRIP: ABNORMAL
LEUKOCYTE ESTERASE UR QL STRIP.AUTO: NEGATIVE
LYMPHOCYTES # BLD AUTO: 1.72 10*3/MM3 (ref 0.7–3.1)
LYMPHOCYTES NFR BLD AUTO: 29.6 % (ref 19.6–45.3)
MCH RBC QN AUTO: 31 PG (ref 26.6–33)
MCHC RBC AUTO-ENTMCNC: 34.1 G/DL (ref 31.5–35.7)
MCV RBC AUTO: 90.9 FL (ref 79–97)
MONOCYTES # BLD AUTO: 0.55 10*3/MM3 (ref 0.1–0.9)
MONOCYTES NFR BLD AUTO: 9.5 % (ref 5–12)
NEUTROPHILS NFR BLD AUTO: 3.06 10*3/MM3 (ref 1.7–7)
NEUTROPHILS NFR BLD AUTO: 52.6 % (ref 42.7–76)
NITRITE UR QL STRIP: NEGATIVE
NRBC BLD AUTO-RTO: 0 /100 WBC (ref 0–0.2)
PH UR STRIP.AUTO: 6.5 [PH] (ref 5–8)
PLATELET # BLD AUTO: 153 10*3/MM3 (ref 140–450)
PMV BLD AUTO: 11.6 FL (ref 6–12)
POTASSIUM SERPL-SCNC: 4 MMOL/L (ref 3.5–5.2)
PROT SERPL-MCNC: 6.9 G/DL (ref 6–8.5)
PROT UR QL STRIP: NEGATIVE
RBC # BLD AUTO: 4.64 10*6/MM3 (ref 3.77–5.28)
SODIUM SERPL-SCNC: 138 MMOL/L (ref 136–145)
SP GR UR STRIP: 1.02 (ref 1–1.03)
UROBILINOGEN UR QL STRIP: ABNORMAL
WBC NRBC COR # BLD: 5.81 10*3/MM3 (ref 3.4–10.8)

## 2022-03-09 PROCEDURE — 83036 HEMOGLOBIN GLYCOSYLATED A1C: CPT

## 2022-03-09 PROCEDURE — 85025 COMPLETE CBC W/AUTO DIFF WBC: CPT

## 2022-03-09 PROCEDURE — 80053 COMPREHEN METABOLIC PANEL: CPT

## 2022-03-09 PROCEDURE — 82306 VITAMIN D 25 HYDROXY: CPT

## 2022-03-09 PROCEDURE — 36415 COLL VENOUS BLD VENIPUNCTURE: CPT

## 2022-03-09 PROCEDURE — 81003 URINALYSIS AUTO W/O SCOPE: CPT

## 2022-03-10 LAB — 25(OH)D3 SERPL-MCNC: 42.1 NG/ML

## 2022-03-21 ENCOUNTER — TELEPHONE (OUTPATIENT)
Dept: ONCOLOGY | Facility: HOSPITAL | Age: 52
End: 2022-03-21

## 2022-03-21 DIAGNOSIS — C50.912 MALIGNANT NEOPLASM OF LEFT BREAST IN FEMALE, ESTROGEN RECEPTOR POSITIVE, UNSPECIFIED SITE OF BREAST: ICD-10-CM

## 2022-03-21 DIAGNOSIS — Z17.0 MALIGNANT NEOPLASM OF LEFT BREAST IN FEMALE, ESTROGEN RECEPTOR POSITIVE, UNSPECIFIED SITE OF BREAST: ICD-10-CM

## 2022-03-21 NOTE — TELEPHONE ENCOUNTER
Caller: Enriqueta Massey    Relationship: Self    Best call back number: [phone]    What medications are you currently taking:   Current Outpatient Medications on File Prior to Visit   Medication Sig Dispense Refill   • acetaminophen-codeine (TYLENOL #3) 300-30 MG per tablet      • atorvastatin (LIPITOR) 20 MG tablet      • Euthyrox 50 MCG tablet      • ibuprofen (ADVIL,MOTRIN) 800 MG tablet      • levothyroxine (SYNTHROID, LEVOTHROID) 100 MCG tablet Take  by mouth Daily.     • SITagliptin-metFORMIN HCl ER (JANUMET XR) 100-1000 MG tablet 1 tab(s)     • tamoxifen (NOLVADEX) 20 MG chemo tablet Take 1 tablet by mouth Daily. 90 tablet 3   • triamcinolone (KENALOG) 0.1 % cream Apply  topically to the appropriate area as directed 3 (Three) Times a Day. 45 g 0   • vitamin D (ERGOCALCIFEROL) 1.25 MG (24230 UT) capsule capsule      • Vitamin D, Cholecalciferol, (CHOLECALCIFEROL) 10 MCG (400 UNIT) tablet Take 400 Units by mouth Daily.       No current facility-administered medications on file prior to visit.      Which medication are you concerned about: TAMOXIFEN 20 MG    Who prescribed you this medication: SHONNA    What are your concerns: NO PROBLEMS, PATIENT CALLED IN TO GET REFILL & IT STATES IT IS SET UP TO BE REORDERED TODAY & JUST VERIFYING THAT IS THE CASE.  TOLD PATIENT I WOULD CONFIRM.

## 2022-03-22 RX ORDER — TAMOXIFEN CITRATE 20 MG/1
TABLET ORAL
Qty: 90 TABLET | Refills: 3 | Status: SHIPPED | OUTPATIENT
Start: 2022-03-22 | End: 2022-06-07

## 2022-06-07 ENCOUNTER — OFFICE VISIT (OUTPATIENT)
Dept: ONCOLOGY | Facility: HOSPITAL | Age: 52
End: 2022-06-07

## 2022-06-07 ENCOUNTER — TELEPHONE (OUTPATIENT)
Dept: ONCOLOGY | Facility: HOSPITAL | Age: 52
End: 2022-06-07

## 2022-06-07 VITALS
DIASTOLIC BLOOD PRESSURE: 77 MMHG | BODY MASS INDEX: 30.12 KG/M2 | HEART RATE: 79 BPM | OXYGEN SATURATION: 100 % | SYSTOLIC BLOOD PRESSURE: 136 MMHG | WEIGHT: 159.39 LBS | TEMPERATURE: 97 F | RESPIRATION RATE: 16 BRPM

## 2022-06-07 DIAGNOSIS — Z12.31 ENCOUNTER FOR SCREENING MAMMOGRAM FOR MALIGNANT NEOPLASM OF BREAST: ICD-10-CM

## 2022-06-07 DIAGNOSIS — Z17.0 MALIGNANT NEOPLASM OF LEFT BREAST IN FEMALE, ESTROGEN RECEPTOR POSITIVE, UNSPECIFIED SITE OF BREAST: Primary | Chronic | ICD-10-CM

## 2022-06-07 DIAGNOSIS — C50.912 MALIGNANT NEOPLASM OF LEFT BREAST IN FEMALE, ESTROGEN RECEPTOR POSITIVE, UNSPECIFIED SITE OF BREAST: Primary | Chronic | ICD-10-CM

## 2022-06-07 PROBLEM — Z78.9: Status: ACTIVE | Noted: 2022-01-12

## 2022-06-07 PROBLEM — N20.2 CALCULUS OF KIDNEY AND URETER: Status: ACTIVE | Noted: 2022-02-08

## 2022-06-07 PROBLEM — Z85.3 PERSONAL HISTORY OF BREAST CANCER: Status: ACTIVE | Noted: 2021-12-17

## 2022-06-07 PROCEDURE — 99214 OFFICE O/P EST MOD 30 MIN: CPT | Performed by: NURSE PRACTITIONER

## 2022-06-07 PROCEDURE — G0463 HOSPITAL OUTPT CLINIC VISIT: HCPCS | Performed by: NURSE PRACTITIONER

## 2022-06-07 RX ORDER — TAMOXIFEN CITRATE 20 MG/1
20 TABLET ORAL 2 TIMES DAILY
Qty: 90 TABLET | Refills: 1 | Status: SHIPPED | OUTPATIENT
Start: 2022-03-22 | End: 2022-12-29 | Stop reason: SDUPTHER

## 2022-06-07 NOTE — PROGRESS NOTES
Chief Complaint  Breast cancer  Ava Li MD Movania, Jawed M, MD      Subjective          Enriqueta Massey presents to [facility] for follow up for breast cancer.     History of Present Illness     Ms. Enriqueta Massey presents for 6 month follow up for breast cancer. She is on year 5 now of surveilance. At her last visit, she was interested in obtaining a right mastectomy and having breast reconstruction. She went to see her surgeon, Dr. Valeriano Carmichael but would not consider a mastectomy. She also went to see Dr. Pastor but would not consider reconstruction of the left breast due to the prior radiation treatment and chemotherapy. She did recommend referral to the [place], but the patient is not interested in traveling to [place].     She is interested in having genetic testing completed for her daughter to determine their risk. Ms. Massey reports she did not complete genetic testing with the initial breast cancer testing.     She is taking Tamoxifen therapy and is on year 5. We discussed [facility] testing and she is interested to determine if she would benefit for extended endocrine therapy.       She did have her port removal done back in December with Dr. Carmichael.     Cancer Staging  No matching staging information was found for the patient.     Treatment intent: curative    Oncology/Hematology History    No history exists.   1) Left breast Cancer: dx US bx: (8/17/16): 2.4 x 2.5 cm at 8:00 position.     Invasive ductal carcinoma: ER/MA+, Her 2neu 2+ by IHC. Her 2neu positive on     final surgical path.             Treatment:             1)Neoadjuvant chemotherapy AC x 4 cycles completed. (12/7/16)      2)Paclitaxel x 12 cycles completed. (3/22/17)      3)Breast MRI: 2 cm x 1 cm irregular mass in the inf. medial left breast with     invasion of the left pectoralis major muscle. Minimal residual foci of type 1 /     2 enhancement. (3/29/17).      4)Left breast  mastectomy. final path: 2.5x1.1x1.1 cm mass. Tumor extension into     the skeletal muscle. No DCIS. No dermal lymphatic invasion. Tumor comes within     1.9 mm of deep margin. 3.5 cm of medial margin and 10 cm of lateral margin.     [address] x 4 are (-). ER+ (95%), AR+ (80%), Ki67 2%, Her 2neu 3+. (5/11/17).      5). Completed Docetaxel / Carboplatin/Herceptin. (7/25/17 - 9/29/17).      6) PET scan: left chest wall activity of mastectomy site. (3/14/18).       7) CT CAP: no evidence of metastatic disease. coarse subpleural interstitial     thickening of the ant. left upper lobe / lingular comp. with radiation fibrosis.    (8/7/18).      8) Completed 12 cycles of Herceptin. (9/26/18).       9) Radiation to the left chest wall. (11/27/17 - 1/8/18)      10) Right breast pain. Right breast US was normal. (2/9/19).      11) MRI of right breast. No signs of malignancy. (3/2019)/      12) Discontinue Tamoxifen. Start Aromasin. Check menopausal status: (6/14/19.      13) Tolerating Aromasin. (7/12/19).      14) Mammogram: benign. (9/30/19) Alternate breast MRI right breast every 6     months for dense breasts.       15) maintenance Aromasin. (1/9/20)      16) Switched back to Tamoxifen due to severe arthraliga's secondary to Aromasin:    (5/2020)      17) Started Duloxetine after discontinuation of Exemestane for arthralgia's.     (6/09/20)      18) Taking Duloxetine and Tamoxifen. Tolerating well. (8/6/20)      19) Right mammo: benign: (11/19/20)        Review of Systems   Constitutional: Positive for fatigue. Negative for appetite change, diaphoresis, fever, unexpected weight gain and unexpected weight loss.   HENT: Negative for hearing loss, sore throat and voice change.    Eyes: Negative for blurred vision, double vision, pain, redness and visual disturbance.   Respiratory: Negative for cough, shortness of breath and wheezing.    Cardiovascular: Negative for chest pain, palpitations and leg swelling.   Endocrine:  Negative for cold intolerance, heat intolerance, polydipsia and polyuria.   Genitourinary: Positive for breast pain (BLE- NEW PAIN). Negative for decreased urine volume, difficulty urinating, frequency and urinary incontinence.   Musculoskeletal: Negative for arthralgias, back pain, joint swelling and myalgias.   Skin: Negative for color change, rash, skin lesions and wound.   Neurological: Negative for dizziness, seizures, numbness and headache.   Hematological: Negative for adenopathy. Does not bruise/bleed easily.   Psychiatric/Behavioral: Negative for depressed mood. The patient is not nervous/anxious.    All other systems reviewed and are negative.      Current Outpatient Medications on File Prior to Visit   Medication Sig Dispense Refill   • acetaminophen-codeine (TYLENOL #3) 300-30 MG per tablet      • atorvastatin (LIPITOR) 20 MG tablet      • Euthyrox 50 MCG tablet      • ibuprofen (ADVIL,MOTRIN) 800 MG tablet      • levothyroxine (SYNTHROID, LEVOTHROID) 100 MCG tablet Take  by mouth Daily.     • SITagliptin-metFORMIN HCl ER (JANUMET XR) 100-1000 MG tablet 1 tab(s)     • triamcinolone (KENALOG) 0.1 % cream Apply  topically to the appropriate area as directed 3 (Three) Times a Day. 45 g 0   • vitamin D (ERGOCALCIFEROL) 1.25 MG (41657 UT) capsule capsule      • Vitamin D, Cholecalciferol, (CHOLECALCIFEROL) 10 MCG (400 UNIT) tablet Take 400 Units by mouth Daily.     • [DISCONTINUED] tamoxifen (NOLVADEX) 20 MG chemo tablet Take 1 tablet by mouth once daily 90 tablet 3     No current facility-administered medications on file prior to visit.       Allergies   Allergen Reactions   • Sulfa Antibiotics Rash and Unknown - Low Severity   • Fenofibrate Unknown - Low Severity     Past Medical History:   Diagnosis Date   • Breast cancer (HCC)    • Diabetes mellitus (HCC)    • Disease of thyroid gland    • Hypertension      Past Surgical History:   Procedure Laterality Date   •  SECTION     • MASTECTOMY Left       Social History     Socioeconomic History   • Marital status: Legally    Tobacco Use   • Smoking status: Former Smoker     Types: Cigarettes   • Smokeless tobacco: Never Used   • Tobacco comment: N/A   Vaping Use   • Vaping Use: Never used   Substance and Sexual Activity   • Alcohol use: Yes     Comment: occasionally   • Drug use: Never     Family History   Problem Relation Age of Onset   • Hypertension Mother    • Hypertension Father    • Diabetes Paternal Grandfather      Immunization History   Administered Date(s) Administered   • COVID-19 (PFIZER) PURPLE CAP 12/23/2020, 12/23/2020, 01/13/2021, 01/13/2021   • Hepatitis B 06/20/2007, 07/24/2007, 01/03/2008   • Td 06/20/2007       Objective   Physical Exam  Vitals and nursing note reviewed.   Constitutional:       Appearance: Normal appearance.   HENT:      Head: Normocephalic.      Nose: Nose normal.      Mouth/Throat:      Mouth: Mucous membranes are moist.   Eyes:      Pupils: Pupils are equal, round, and reactive to light.   Cardiovascular:      Rate and Rhythm: Normal rate and regular rhythm.      Pulses: Normal pulses.      Heart sounds: Normal heart sounds.   Pulmonary:      Effort: Pulmonary effort is normal.      Breath sounds: Normal breath sounds.   Abdominal:      General: Bowel sounds are normal.      Palpations: Abdomen is soft.   Musculoskeletal:         General: Normal range of motion.      Cervical back: Normal range of motion.   Skin:     General: Skin is warm and dry.      Capillary Refill: Capillary refill takes less than 2 seconds.   Neurological:      General: No focal deficit present.      Mental Status: She is alert and oriented to person, place, and time.   Psychiatric:         Mood and Affect: Mood normal.         Behavior: Behavior normal.         Thought Content: Thought content normal.         Judgment: Judgment normal.     breast exam: left breast mastectomy site with no palpable LN or mass palpated on exam. Right breast  with no palpable LN or mass palpated in the axillary area or breast.     Vitals:    06/07/22 1336   BP: 136/77   Pulse: 79   Resp: 16   Temp: 97 °F (36.1 °C)   SpO2: 100%   Weight: 72.3 kg (159 lb 6.3 oz)   PainSc:   5   PainLoc: Breast  Comment: ble     ECOG score: 0         ECOG: (0) Fully Active - Able to Carry On All Pre-disease Performance Without Restriction  Fall Risk Assessment was completed, and patient is at low risk for falls.  PHQ-9 Total Score: 0       The patient is  experiencing fatigue. Fatigue score: 5    PT/OT Functional Screening: PT fx screen: No needs identified  Speech Functional Screening: Speech fx screen: No needs identified  Rehab to be ordered: Rehab to be ordered: No needs identified        Result Review :   The following data was reviewed by: ERROL Richards on 06/07/2022:  Lab Results   Component Value Date    HGB 14.4 03/09/2022    HCT 42.2 03/09/2022    MCV 90.9 03/09/2022     03/09/2022    WBC 5.81 03/09/2022    NEUTROABS 3.06 03/09/2022    LYMPHSABS 1.72 03/09/2022    MONOSABS 0.55 03/09/2022    EOSABS 0.43 (H) 03/09/2022    BASOSABS 0.04 03/09/2022     Lab Results   Component Value Date    GLUCOSE 214 (H) 03/09/2022    BUN 8 03/09/2022    CREATININE 0.64 03/09/2022     03/09/2022    K 4.0 03/09/2022     03/09/2022    CO2 26.2 03/09/2022    CALCIUM 9.2 03/09/2022    PROTEINTOT 6.9 03/09/2022    ALBUMIN 4.50 03/09/2022    BILITOT 0.5 03/09/2022    ALKPHOS 65 03/09/2022    AST 55 (H) 03/09/2022    ALT 48 (H) 03/09/2022          Assessment and Plan    Diagnoses and all orders for this visit:    1. Malignant neoplasm of left breast in female, estrogen receptor positive, unspecified site of breast (HCC) (Primary)  -     Ambulatory Referral to Genetic Counseling/Testing - [facility]  -     tamoxifen (NOLVADEX) 20 MG chemo tablet; Take 1 tablet by mouth 2 (Two) Times a Day.  Dispense: 90 tablet; Refill: 1    2. Encounter for screening mammogram for malignant  neoplasm of breast  -     Mammo screening modified with tomosynthesis right w CAD; Future    I have referred her for genetic testing to be considered on the patient as well as her daughter to determine their risk of familial breast cancer.     BCI testing to be done and spoke to Mari who will complete the paperwork.     Right mammogram to be done after 11/19/2022.     Refill Tamoxifen.     She will need a follow up CT scan to evaluate the RLL 7 mm PN in February of 2023.     Call with any questions or concerns.     I spent 30 minutes caring for Enriqueta on this date of service. This time includes time spent by me in the following activities:preparing for the visit, reviewing tests, obtaining and/or reviewing a separately obtained history, performing a medically appropriate examination and/or evaluation , counseling and educating the patient/family/caregiver, ordering medications, tests, or procedures, documenting information in the medical record and independently interpreting results and communicating that information with the patient/family/caregiver    Patient Follow Up: 6 months.   Patient was given instructions and counseling regarding her condition or for health maintenance advice. Please see specific information pulled into the AVS if appropriate.     Debora Varma, APRN    6/7/2022

## 2022-06-07 NOTE — TELEPHONE ENCOUNTER
Caller: VIPIN    Relationship: [facility]    Best call back number: [phone]    What is the best time to reach you: ANY    Who are you requesting to speak with (clinical staff, provider,  specific staff member): CLINICAL STAFF    What was the call regarding: HAS QUESTIONS ABOUT PT'S RX TAMOXIFEN. STATES THE DIRECTIONS STATE FOR THE PT TO TAKE TWICE A DAY. PT WAS PREVIOUSLY TAKING ONCE A DAY, AND PHARMACY STATES THE MEDICATION IS TYPICALLY TAKEN ONCE A DAY. CALLING TO MAKE SURE THE DIRECTIONS ON THE PT'S MEDICATION ARE CORRECT    Do you require a callback: YES

## 2022-06-17 ENCOUNTER — TRANSCRIBE ORDERS (OUTPATIENT)
Dept: LAB | Facility: HOSPITAL | Age: 52
End: 2022-06-17

## 2022-06-17 DIAGNOSIS — E11.9 DIABETES MELLITUS WITHOUT COMPLICATION: Primary | ICD-10-CM

## 2022-06-17 DIAGNOSIS — I10 ESSENTIAL HYPERTENSION, MALIGNANT: ICD-10-CM

## 2022-06-18 ENCOUNTER — LAB (OUTPATIENT)
Dept: LAB | Facility: HOSPITAL | Age: 52
End: 2022-06-18

## 2022-06-18 DIAGNOSIS — E11.9 DIABETES MELLITUS WITHOUT COMPLICATION: ICD-10-CM

## 2022-06-18 DIAGNOSIS — I10 ESSENTIAL HYPERTENSION, MALIGNANT: ICD-10-CM

## 2022-06-18 LAB
ALBUMIN SERPL-MCNC: 4 G/DL (ref 3.5–5.2)
ALBUMIN UR-MCNC: <1.2 MG/DL
ALBUMIN/GLOB SERPL: 1.7 G/DL
ALP SERPL-CCNC: 56 U/L (ref 39–117)
ALT SERPL W P-5'-P-CCNC: 28 U/L (ref 1–33)
ANION GAP SERPL CALCULATED.3IONS-SCNC: 9.6 MMOL/L (ref 5–15)
AST SERPL-CCNC: 29 U/L (ref 1–32)
BASOPHILS # BLD AUTO: 0.05 10*3/MM3 (ref 0–0.2)
BASOPHILS NFR BLD AUTO: 0.8 % (ref 0–1.5)
BILIRUB SERPL-MCNC: 0.4 MG/DL (ref 0–1.2)
BILIRUB UR QL STRIP: NEGATIVE
BUN SERPL-MCNC: 9 MG/DL (ref 6–20)
BUN/CREAT SERPL: 12 (ref 7–25)
CALCIUM SPEC-SCNC: 8.6 MG/DL (ref 8.6–10.5)
CHLORIDE SERPL-SCNC: 106 MMOL/L (ref 98–107)
CHOLEST SERPL-MCNC: 125 MG/DL (ref 0–200)
CLARITY UR: CLEAR
CO2 SERPL-SCNC: 24.4 MMOL/L (ref 22–29)
COLOR UR: YELLOW
CREAT SERPL-MCNC: 0.75 MG/DL (ref 0.57–1)
CREAT UR-MCNC: 95.2 MG/DL
DEPRECATED RDW RBC AUTO: 40 FL (ref 37–54)
EGFRCR SERPLBLD CKD-EPI 2021: 96.5 ML/MIN/1.73
EOSINOPHIL # BLD AUTO: 0.46 10*3/MM3 (ref 0–0.4)
EOSINOPHIL NFR BLD AUTO: 7.2 % (ref 0.3–6.2)
ERYTHROCYTE [DISTWIDTH] IN BLOOD BY AUTOMATED COUNT: 11.9 % (ref 12.3–15.4)
GLOBULIN UR ELPH-MCNC: 2.4 GM/DL
GLUCOSE SERPL-MCNC: 159 MG/DL (ref 65–99)
GLUCOSE UR STRIP-MCNC: ABNORMAL MG/DL
HBA1C MFR BLD: 9.7 % (ref 4.8–5.6)
HCT VFR BLD AUTO: 43.5 % (ref 34–46.6)
HDLC SERPL-MCNC: 47 MG/DL (ref 40–60)
HGB BLD-MCNC: 14.9 G/DL (ref 12–15.9)
HGB UR QL STRIP.AUTO: NEGATIVE
IMM GRANULOCYTES # BLD AUTO: 0.02 10*3/MM3 (ref 0–0.05)
IMM GRANULOCYTES NFR BLD AUTO: 0.3 % (ref 0–0.5)
KETONES UR QL STRIP: NEGATIVE
LDLC SERPL CALC-MCNC: 57 MG/DL (ref 0–100)
LDLC/HDLC SERPL: 1.16 {RATIO}
LEUKOCYTE ESTERASE UR QL STRIP.AUTO: NEGATIVE
LYMPHOCYTES # BLD AUTO: 2.02 10*3/MM3 (ref 0.7–3.1)
LYMPHOCYTES NFR BLD AUTO: 31.6 % (ref 19.6–45.3)
MCH RBC QN AUTO: 31.4 PG (ref 26.6–33)
MCHC RBC AUTO-ENTMCNC: 34.3 G/DL (ref 31.5–35.7)
MCV RBC AUTO: 91.6 FL (ref 79–97)
MICROALBUMIN/CREAT UR: NORMAL MG/G{CREAT}
MONOCYTES # BLD AUTO: 0.69 10*3/MM3 (ref 0.1–0.9)
MONOCYTES NFR BLD AUTO: 10.8 % (ref 5–12)
NEUTROPHILS NFR BLD AUTO: 3.15 10*3/MM3 (ref 1.7–7)
NEUTROPHILS NFR BLD AUTO: 49.3 % (ref 42.7–76)
NITRITE UR QL STRIP: NEGATIVE
NRBC BLD AUTO-RTO: 0 /100 WBC (ref 0–0.2)
PH UR STRIP.AUTO: 5.5 [PH] (ref 5–8)
PLATELET # BLD AUTO: 149 10*3/MM3 (ref 140–450)
PMV BLD AUTO: 12.2 FL (ref 6–12)
POTASSIUM SERPL-SCNC: 4.2 MMOL/L (ref 3.5–5.2)
PROT SERPL-MCNC: 6.4 G/DL (ref 6–8.5)
PROT UR QL STRIP: NEGATIVE
RBC # BLD AUTO: 4.75 10*6/MM3 (ref 3.77–5.28)
SODIUM SERPL-SCNC: 140 MMOL/L (ref 136–145)
SP GR UR STRIP: 1.02 (ref 1–1.03)
TRIGL SERPL-MCNC: 118 MG/DL (ref 0–150)
UROBILINOGEN UR QL STRIP: ABNORMAL
VLDLC SERPL-MCNC: 21 MG/DL (ref 5–40)
WBC NRBC COR # BLD: 6.39 10*3/MM3 (ref 3.4–10.8)

## 2022-06-18 PROCEDURE — 36415 COLL VENOUS BLD VENIPUNCTURE: CPT

## 2022-06-18 PROCEDURE — 82570 ASSAY OF URINE CREATININE: CPT

## 2022-06-18 PROCEDURE — 81003 URINALYSIS AUTO W/O SCOPE: CPT

## 2022-06-18 PROCEDURE — 83036 HEMOGLOBIN GLYCOSYLATED A1C: CPT

## 2022-06-18 PROCEDURE — 85025 COMPLETE CBC W/AUTO DIFF WBC: CPT

## 2022-06-18 PROCEDURE — 80053 COMPREHEN METABOLIC PANEL: CPT

## 2022-06-18 PROCEDURE — 82043 UR ALBUMIN QUANTITATIVE: CPT

## 2022-06-18 PROCEDURE — 87086 URINE CULTURE/COLONY COUNT: CPT

## 2022-06-18 PROCEDURE — 80061 LIPID PANEL: CPT

## 2022-06-19 LAB — BACTERIA SPEC AEROBE CULT: NORMAL

## 2022-07-05 ENCOUNTER — TELEPHONE (OUTPATIENT)
Dept: GENETICS | Facility: HOSPITAL | Age: 52
End: 2022-07-05

## 2022-07-11 ENCOUNTER — CLINICAL SUPPORT (OUTPATIENT)
Dept: GENETICS | Facility: HOSPITAL | Age: 52
End: 2022-07-11

## 2022-07-11 DIAGNOSIS — Z85.3 PERSONAL HISTORY OF BREAST CANCER: Primary | ICD-10-CM

## 2022-07-11 NOTE — PROGRESS NOTES
Enriqueta Massey is a 52-year-old female who was referred for genetic counseling due to a personal and family history of breast cancer.  Genetic counseling was provided via telehealth.  Ms. Massey was diagnosed with a breast cancer at age 46 and had a unilateral mastectomy.   Ms. Massey retains her uterus and ovaries. Ms. Massey was interested in discussing her risk for a hereditary cancer syndrome, and decided to pursue genetic testing.   Ms. Massey opted to pursue comprehensive testing via the CancerNext panel ordered through [employer] which includes BRCA1/2 and 34 additional genes associated with an increased risk of breast cancer or other cancers (APC, RUSLAN, AXIN2, BARD1, BMPR1A, BRCA1, BRCA2, BRIP1, CDH1, CDK4, CDKN2A, CHEK2, DICER1, EPCAM, GREM1, HOXB13, MLH1, MRE11A, MSH2, MSH3, MSH6, MUTYH, NBN, NF1, NTHL1, PALB2, PMS2, POLD1, POLE, PTEN, RAD51C, RAD51D, RECQL, SMAD4, SMARCA4, STK11, and TP53). Blood sample collection was coordinated today at [facility], and results are expected in 2-3 weeks.      PERTINENT FAMILY HISTORY:  Paternal family health history is not known.    RISK ASSESSMENT:  Ms. Massey’s personal history of early onset breast cancer raises the question of a hereditary cancer syndrome.   NCCN guidelines recommend BRCA1/2 testing for individuals diagnosed with breast cancer at or under age 50 if there is limited family history information.  Therefore, testing is appropriate for Ms. Massey.  We discussed that the standard approach to BRCA1/2 testing is via a multigene panel that evaluates BRCA1/2 and multiple other genes known to impact cancer risk.  This risk assessment is based on the family history information provided at the time of the appointment.  The assessment could change in the future should new information be obtained.     GENETIC COUNSELING: We reviewed the family history information in detail.  Cases of breast cancer follow three general patterns: sporadic, familial, and  hereditary.  While most cancer is sporadic, some cases appear to occur in family clusters.  These cases are said to be familial and account for 10-20% of breast cancer cases.  Familial cases may be due to a combination of shared genes and environmental factors among family members.  In even fewer cases (5-10%), the risk for cancer is inherited, and the genes responsible for the increased cancer risk are known.       Family histories typical of hereditary cancer syndromes usually include multiple first- and second-degree relatives diagnosed with cancer types that define a syndrome.  These cases tend to be diagnosed at younger-than-expected ages and can be bilateral or multifocal.  The cancer in these families follows an autosomal dominant inheritance pattern, which indicates the likely presence of a mutation in a cancer susceptibility gene.  Children and siblings of an individual believed to carry this mutation have a 50% chance of inheriting that mutation, thereby inheriting the increased risk to develop cancer.  These mutations can be passed down from the maternal or the paternal lineage.     Hereditary breast cancer accounts for 5-10% of all cases of breast cancer.  A significant proportion of hereditary breast cancer can be attributed to mutations in the BRCA1 and BRCA2 genes.  Mutations in these genes confer an increased risk for breast cancer, ovarian cancer, male breast cancer, prostate cancer and pancreatic cancer.  There are other genes that are known to be associated with an increased risk for breast cancer and other cancers. In order to get as much information as possible regarding Ms. Massey’s personal risks and potential risks for her family, testing was pursued through a multigene panel that would look at several other genes known to increase the risk for breast cancer and other cancers.     GENETIC TESTING:  The risks, benefits and limitations of genetic testing and implications for clinical management  following testing were reviewed.  DNA test results can influence decisions regarding screening, prevention and surgical management.  Genetic testing can have significant psychological implications for both individuals and families.  Also discussed was the possibility of insurance discrimination based on genetic test results and the laws ([place]) in place to prevent this.     We discussed panel testing, which would involve testing for BRCA1/2 as well as several other cancer susceptibility genes at the same time.  The benefits and limitations of genetic testing were discussed and Ms. Massey decided to pursue testing. The implications of a positive or negative test result were discussed. We discussed the possibility that, in some cases, genetic test results may be uninformative due to the identification of a genetic variant of uncertain significance. These variants may or may not be associated with an increased cancer risk.  VUSs are frequently reported through multigene panel testing, given the presence of genetic variation in the population and the number of genes being analyzed. Given her personal history, a negative test result would not eliminate all breast cancer risk to her relatives, although the risk would not be as high as it would with positive genetic testing.          PLAN:   Results are expected in 2-3 weeks.  Genetic counseling remains available to Ms. Massey and her family in the future, and she is welcome to contact us at [phone] with any questions she may have.        Salma Maher MS, [facility] [place], [facility]  Licensed Certified Genetic Counselor

## 2022-07-18 ENCOUNTER — APPOINTMENT (OUTPATIENT)
Dept: GENETICS | Facility: HOSPITAL | Age: 52
End: 2022-07-18

## 2022-07-25 ENCOUNTER — TELEPHONE (OUTPATIENT)
Dept: GENETICS | Facility: HOSPITAL | Age: 52
End: 2022-07-25

## 2022-07-26 ENCOUNTER — TELEPHONE (OUTPATIENT)
Dept: GENETICS | Facility: HOSPITAL | Age: 52
End: 2022-07-26

## 2022-07-26 ENCOUNTER — DOCUMENTATION (OUTPATIENT)
Dept: GENETICS | Facility: HOSPITAL | Age: 52
End: 2022-07-26

## 2022-07-26 NOTE — PROGRESS NOTES
Enriqueta Massey is a 52-year-old female who was referred for genetic counseling due to a personal and family history of breast cancer.  Genetic counseling was provided via telehealth.  Ms. Massey was diagnosed with a breast cancer at age 46 and had a unilateral mastectomy.   Ms. Massey retains her uterus and ovaries. Ms. Massey was interested in discussing her risk for a hereditary cancer syndrome, and decided to pursue genetic testing.   Ms. Massey opted to pursue comprehensive testing via the CancerNext panel ordered through [employer] which includes BRCA1/2 and 34 additional genes associated with an increased risk of breast cancer or other cancers (APC, RUSLAN, AXIN2, BARD1, BMPR1A, BRCA1, BRCA2, BRIP1, CDH1, CDK4, CDKN2A, CHEK2, DICER1, EPCAM, GREM1, HOXB13, MLH1, MRE11A, MSH2, MSH3, MSH6, MUTYH, NBN, NF1, NTHL1, PALB2, PMS2, POLD1, POLE, PTEN, RAD51C, RAD51D, RECQL, SMAD4, SMARCA4, STK11, and TP53). Genetic testing was negative for pathogenic mutations in BRCA1/2 and 34 additional genes on the CancerNext panel.  These normal results were discussed with Ms. Massey by telephone on 7/26/2022.      PERTINENT FAMILY HISTORY:  Paternal family health history is not known.    RISK ASSESSMENT:  Ms. Massey’s personal history of early onset breast cancer raises the question of a hereditary cancer syndrome.   NCCN guidelines recommend BRCA1/2 testing for individuals diagnosed with breast cancer at or under age 50, if there is limited family history information.  Therefore, testing is appropriate for Ms. Massey.  We discussed that the standard approach to BRCA1/2 testing is via a multigene panel that evaluates BRCA1/2 and multiple other genes known to impact cancer risk.  This risk assessment is based on the family history information provided at the time of the appointment.  The assessment could change in the future should new information be obtained.     GENETIC COUNSELING: We reviewed the family history information in detail.   Cases of breast cancer follow three general patterns: sporadic, familial, and hereditary.  While most cancer is sporadic, some cases appear to occur in family clusters.  These cases are said to be familial and account for 10-20% of breast cancer cases.  Familial cases may be due to a combination of shared genes and environmental factors among family members.  In even fewer cases (5-10%), the risk for cancer is inherited, and the genes responsible for the increased cancer risk are known.       Family histories typical of hereditary cancer syndromes usually include multiple first- and second-degree relatives diagnosed with cancer types that define a syndrome.  These cases tend to be diagnosed at younger-than-expected ages and can be bilateral or multifocal.  The cancer in these families follows an autosomal dominant inheritance pattern, which indicates the likely presence of a mutation in a cancer susceptibility gene.  Children and siblings of an individual believed to carry this mutation have a 50% chance of inheriting that mutation, thereby inheriting the increased risk to develop cancer.  These mutations can be passed down from the maternal or the paternal lineage.     Hereditary breast cancer accounts for 5-10% of all cases of breast cancer.  A significant proportion of hereditary breast cancer can be attributed to mutations in the BRCA1 and BRCA2 genes.  Mutations in these genes confer an increased risk for breast cancer, ovarian cancer, male breast cancer, prostate cancer and pancreatic cancer.  There are other genes that are known to be associated with an increased risk for breast cancer and other cancers. In order to get as much information as possible regarding Ms. Massey’s personal risks and potential risks for her family, testing was pursued through a multigene panel that would look at several other genes known to increase the risk for breast cancer and other cancers.     GENETIC TESTING:  The risks,  benefits and limitations of genetic testing and implications for clinical management following testing were reviewed.  DNA test results can influence decisions regarding screening, prevention and surgical management.  Genetic testing can have significant psychological implications for both individuals and families.  Also discussed was the possibility of insurance discrimination based on genetic test results and the laws ([place]) in place to prevent this.     We discussed panel testing, which would involve testing for BRCA1/2 as well as several other cancer susceptibility genes at the same time.  The benefits and limitations of genetic testing were discussed and Ms. Massey decided to pursue testing. The implications of a positive or negative test result were discussed. We discussed the possibility that, in some cases, genetic test results may be uninformative due to the identification of a genetic variant of uncertain significance. These variants may or may not be associated with an increased cancer risk.  VUSs are frequently reported through multigene panel testing, given the presence of genetic variation in the population and the number of genes being analyzed. Given her personal history, a negative test result would not eliminate all breast cancer risk to her relatives, although the risk would not be as high as it would with positive genetic testing.        TEST RESULTS:  Genetic testing was negative for known pathogenic mutations by sequencing and rearrangement testing for the genes on the CancerNext panel (see attached results). This negative result greatly lowers the risk of a hereditary cancer syndrome for Ms. Massey. This assessment is based on the information provided at time of consultation.    CANCER SCREENING: Ms. Massey’s surveillance and management will be determined by her oncology team. Despite the negative genetic test results, Ms. Massey’s female relatives may have a somewhat increased lifetime risk for  breast cancer based on family history. Female relatives could have a risk assessment performed using a family history-based model, such as the Tyrer-Cuzick model, to determine their individual risks.    Given the increased risk, options available to individuals with a high lifetime risk for breast cancer were briefly discussed.   Surveillance for individuals with a high lifetime risk of breast cancer (>20%, versus the average risk of 12%), based on NCCN guidelines, would consist of semi-annual clinical breast exams and monthly self-breast exams starting by age 18 and annual mammography starting 10 years younger than the earliest diagnosis in a close relative, or starting by age 40, whichever is earliest.  According to an American Cancer Society expert panel, annual breast MRI should be offered to women whose lifetime risk of breast cancer is 20-25 percent or more, also starting by age 40 or earlier if indicated by family history.      PLAN: Genetic counseling remains available to Ms. Massey and her family. She is welcome to contact us with any questions or concerns at [phone].     Salma Maher MS, [facility] [place], [facility]  Licensed Certified Genetic Counselor    Cc: ERROL Fregoso

## 2022-07-26 NOTE — TELEPHONE ENCOUNTER
Spoke with patient and disclosed negative genetic results. Patient requested copy be mailed to her. She informed me of her new address and I updated her demographics.

## 2022-11-29 ENCOUNTER — APPOINTMENT (OUTPATIENT)
Dept: MAMMOGRAPHY | Facility: HOSPITAL | Age: 52
End: 2022-11-29

## 2022-12-29 DIAGNOSIS — C50.912 MALIGNANT NEOPLASM OF LEFT BREAST IN FEMALE, ESTROGEN RECEPTOR POSITIVE, UNSPECIFIED SITE OF BREAST: Chronic | ICD-10-CM

## 2022-12-29 DIAGNOSIS — Z17.0 MALIGNANT NEOPLASM OF LEFT BREAST IN FEMALE, ESTROGEN RECEPTOR POSITIVE, UNSPECIFIED SITE OF BREAST: Chronic | ICD-10-CM

## 2022-12-29 RX ORDER — TAMOXIFEN CITRATE 20 MG/1
20 TABLET ORAL 2 TIMES DAILY
Qty: 90 TABLET | Refills: 2 | Status: SHIPPED | OUTPATIENT
Start: 2022-12-29 | End: 2023-03-22

## 2022-12-29 NOTE — TELEPHONE ENCOUNTER
Caller: Enriqueta Massey    Relationship: Self    Best call back number: [phone]    What is the best time to reach you: ANYTIME    Who are you requesting to speak with (clinical staff, provider,  specific staff member): CLINICAL    What was the call regarding: PT NEEDS TO RESCHEDULE MAMMOGRAM AND FOLLOW UP APPT.  ALSO NEEDS REFILL FOR TAMOXIFEN CALLED IN TO A NEW PHARMACY- PLEASE SEND TO  [facility]    Do you require a callback: YES, PLEASE CALL PT BACK TO LET HER KNOW WHEN RX HAS BEEN SENT TO  [facility], AND TO RESCHEDULE.

## 2023-02-20 ENCOUNTER — TELEPHONE (OUTPATIENT)
Dept: GENERAL RADIOLOGY | Facility: HOSPITAL | Age: 53
End: 2023-02-20
Payer: COMMERCIAL

## 2023-02-20 NOTE — TELEPHONE ENCOUNTER
Patient annual mammography reminder letter was returned for reason not deliverable as addressed. Per the patient, she was going to move but changed her mind and her mail was messed up for a little while. The letter will be mailed again to the address on file.

## 2023-03-21 ENCOUNTER — HOSPITAL ENCOUNTER (OUTPATIENT)
Dept: MAMMOGRAPHY | Facility: HOSPITAL | Age: 53
Discharge: HOME | End: 2023-03-21
Admitting: NURSE PRACTITIONER
Payer: COMMERCIAL

## 2023-03-21 DIAGNOSIS — Z12.31 ENCOUNTER FOR SCREENING MAMMOGRAM FOR MALIGNANT NEOPLASM OF BREAST: ICD-10-CM

## 2023-03-21 PROCEDURE — 77067 SCR MAMMO BI INCL CAD: CPT

## 2023-03-21 PROCEDURE — 77063 BREAST TOMOSYNTHESIS BI: CPT

## 2023-03-22 ENCOUNTER — OFFICE VISIT (OUTPATIENT)
Dept: ONCOLOGY | Facility: HOSPITAL | Age: 53
End: 2023-03-22
Payer: COMMERCIAL

## 2023-03-22 VITALS
OXYGEN SATURATION: 97 % | RESPIRATION RATE: 16 BRPM | DIASTOLIC BLOOD PRESSURE: 75 MMHG | HEART RATE: 96 BPM | TEMPERATURE: 98.1 F | SYSTOLIC BLOOD PRESSURE: 131 MMHG | WEIGHT: 163.36 LBS | BODY MASS INDEX: 30.87 KG/M2

## 2023-03-22 DIAGNOSIS — C50.912 MALIGNANT NEOPLASM OF LEFT BREAST IN FEMALE, ESTROGEN RECEPTOR POSITIVE, UNSPECIFIED SITE OF BREAST: Primary | ICD-10-CM

## 2023-03-22 DIAGNOSIS — Z12.39 ENCOUNTER FOR BREAST CANCER SCREENING USING NON-MAMMOGRAM MODALITY: ICD-10-CM

## 2023-03-22 DIAGNOSIS — Z17.0 MALIGNANT NEOPLASM OF LEFT BREAST IN FEMALE, ESTROGEN RECEPTOR POSITIVE, UNSPECIFIED SITE OF BREAST: Primary | ICD-10-CM

## 2023-03-22 DIAGNOSIS — M89.8X9 BONE PAIN: ICD-10-CM

## 2023-03-22 PROBLEM — Z87.442 HISTORY OF RENAL CALCULI: Status: ACTIVE | Noted: 2022-06-20

## 2023-03-22 PROCEDURE — 99214 OFFICE O/P EST MOD 30 MIN: CPT | Performed by: NURSE PRACTITIONER

## 2023-03-22 PROCEDURE — G0463 HOSPITAL OUTPT CLINIC VISIT: HCPCS | Performed by: NURSE PRACTITIONER

## 2023-03-22 RX ORDER — TAMOXIFEN CITRATE 20 MG/1
20 TABLET ORAL 2 TIMES DAILY
Qty: 90 TABLET | Refills: 1 | Status: SHIPPED | OUTPATIENT
Start: 2023-03-22 | End: 2023-03-23

## 2023-03-22 NOTE — PROGRESS NOTES
Chief Complaint/Reason for Referral:  Breast Cancer    Ava Li MD Movania, Jawed M, MD        Subjective    History of Present Illness     Ms. Enriqueta Massey presents for 6 month follow up for left breast cancer diagnosed in 8/17/2016 with IDC, hormone receptor positive and Her 2 positive on final path. Received chemotherapy and Herceptin and radiation for chest wall invasion. Completed left mastectomy. Still has native right breast and receives mammogram and alternates with MRI's.     Currently, taking Tamoxifen. She did try Aromasin previously and did not tolerate. She has history of arthralgia and did not try the other AI therapy. Tolerates Tamoxifen well. Recently had BCI testing ordered at the last visit and testing did show she will benefit for extended endocrine therapy with plans to continue endocrine therapy until 2028.     She does report she did see the  and did not show any genetic abnormalities noted.     Completed mammogram on 3/22 which was benign.     She does completed of right hip pain and mid back pain for the last 6 months.     Follows with her PCP for lab work.     Works as an RN.       Cancer Staging   No matching staging information was found for the patient.     Treatment intent: curative    Oncology/Hematology History    No history exists.   1) Left breast Cancer: dx US bx: (8/17/16): 2.4 x 2.5 cm at 8:00 position.     Invasive ductal carcinoma: ER/AZ+, Her 2neu 2+ by IHC. Her 2neu positive on  final surgical path.    Stage IIIB T4, N0,M0.         Treatment:             1)Neoadjuvant chemotherapy AC x 4 cycles completed. (12/7/16)      2)Paclitaxel x 12 cycles completed. (3/22/17)      3)Breast MRI: 2 cm x 1 cm irregular mass in the inf. medial left breast with     invasion of the left pectoralis major muscle. Minimal residual foci of type 1 /     2 enhancement. (3/29/17).      4)Left breast mastectomy. final path: 2.5x1.1x1.1 cm mass. Tumor extension into     the skeletal  muscle. No DCIS. No dermal lymphatic invasion. Tumor comes within     1.9 mm of deep margin. 3.5 cm of medial margin and 10 cm of lateral margin.     [address] x 4 are (-). ER+ (95%), NC+ (80%), Ki67 2%, Her 2neu 3+. (5/11/17).      5). Completed Docetaxel / Carboplatin/Herceptin. (7/25/17 - 9/29/17).      6) PET scan: left chest wall activity of mastectomy site. (3/14/18).       7) CT CAP: no evidence of metastatic disease. coarse subpleural interstitial     thickening of the ant. left upper lobe / lingular comp. with radiation fibrosis.    (8/7/18).      8) Completed 12 cycles of Herceptin. (9/26/18).       9) Radiation to the left chest wall. (11/27/17 - 1/8/18)      10) Right breast pain. Right breast US was normal. (2/9/19).      11) MRI of right breast. No signs of malignancy. (3/2019)/      12) Discontinue Tamoxifen. Start Aromasin. Check menopausal status: (6/14/19.      13) Tolerating Aromasin. (7/12/19).      14) Mammogram: benign. (9/30/19) Alternate breast MRI right breast every 6     months for dense breasts.       15) maintenance Aromasin. (1/9/20)      16) Switched back to Tamoxifen due to severe arthraliga's secondary to Aromasin:    (5/2020)      17) Started Duloxetine after discontinuation of Exemestane for arthralgia's.     (6/09/20)      18) Taking Duloxetine and Tamoxifen. Tolerating well. (8/6/20)      19) Right mammo: benign: (11/19/20)        Review of Systems   Constitutional: Positive for fatigue. Negative for appetite change, diaphoresis, fever, unexpected weight gain and unexpected weight loss.   HENT: Negative for hearing loss, sore throat and voice change.    Eyes: Negative for blurred vision, double vision, pain, redness and visual disturbance.   Respiratory: Negative for cough, shortness of breath and wheezing.    Cardiovascular: Negative for chest pain, palpitations and leg swelling.   Endocrine: Negative for cold intolerance, heat intolerance, polydipsia and polyuria.  "  Genitourinary: Negative for decreased urine volume, difficulty urinating, frequency and urinary incontinence.   Musculoskeletal: Negative for arthralgias, back pain, joint swelling and myalgias.   Skin: Negative for color change, rash, skin lesions and wound.   Neurological: Negative for dizziness, seizures, numbness and headache.   Hematological: Negative for adenopathy. Does not bruise/bleed easily.   Psychiatric/Behavioral: Negative for depressed mood. The patient is not nervous/anxious.    All other systems reviewed and are negative.      Current Outpatient Medications on File Prior to Visit   Medication Sig Dispense Refill   • atorvastatin (LIPITOR) 20 MG tablet      • acetaminophen-codeine (TYLENOL #3) 300-30 MG per tablet  (Patient not taking: Reported on 3/22/2023)     • atorvastatin (LIPITOR) 20 MG tablet 1 tab(s) orally once a day in EVENING 90 days 90 tablet 0   • Continuous Blood Gluc Sensor (FreeStyle Carla 2 Sensor) misc Use As Directed. 2 each 2   • Continuous Blood Gluc Sensor (FreeStyle Carla 3 Sensor) misc 1 application by Other route Every 14 (Fourteen) Days. 6 each 3   • Euthyrox 50 MCG tablet  (Patient not taking: Reported on 3/22/2023)     • ibuprofen (ADVIL,MOTRIN) 800 MG tablet  (Patient not taking: Reported on 3/22/2023)     • Insulin Glargine-yfgn (Semglee, yfgn,) 100 UNIT/ML solution pen-injector Inject 30 Units under the skin into the appropriate area as directed every night at bedtime. 15 mL 2   • Insulin Pen Needle 32G X 4 MM misc Use as Directed Up To 5 Times Daily with Insulin Administration. 150 each 2   • insulin regular (NovoLIN R) 100 UNIT/ML injection up to 10 units subcutaneously three times daily with meal 90 days 20 mL 0   • Insulin Syringe 29G X 1/2\" 1 ML misc Use As Directed. 10 each 11   • Insulin Syringe 29G X 1/2\" 1 ML misc 1 application 3 (Three) Times a Day. 180 each 3   • levothyroxine (Synthroid) 50 MCG tablet 1 tab(s) orally once a day 90 days 90 tablet 0   • " levothyroxine (SYNTHROID, LEVOTHROID) 100 MCG tablet Take  by mouth Daily.     • Semaglutide,0.25 or 0.5MG/DOS, (Ozempic, 0.25 or 0.5 MG/DOSE,) 2 MG/1.5ML solution pen-injector 0.5 mg subcutaneously once a week 90 days 1.5 mL 0   • Semaglutide,0.25 or 0.5MG/DOS, (Ozempic, 0.25 or 0.5 MG/DOSE,) 2 MG/1.5ML solution pen-injector Inject 0.5 mg under the skin into the appropriate area as directed Every 7 (Seven) Days. 16 mL 0   • SITagliptin-metFORMIN HCl ER (JANUMET XR) 100-1000 MG tablet 1 tab(s)     • SITagliptin-metFORMIN HCl ER (Janumet XR) 100-1000 MG tablet Take 1 tablet by mouth Daily. 90 tablet 0   • SITagliptin-metFORMIN HCl ER (Janumet XR) 100-1000 MG tablet 1 tab(s) orally once a day (in the evening) 90 days 90 tablet 0   • triamcinolone (KENALOG) 0.1 % cream Apply  topically to the appropriate area as directed 3 (Three) Times a Day. (Patient not taking: Reported on 3/22/2023) 45 g 0   • vitamin D (ERGOCALCIFEROL) 1.25 MG (92155 UT) capsule capsule      • vitamin D (ERGOCALCIFEROL) 1.25 MG (88106 UT) capsule capsule 1 cap(s) orally twice a week 90 days 25 capsule 0   • Vitamin D, Cholecalciferol, (CHOLECALCIFEROL) 10 MCG (400 UNIT) tablet Take 400 Units by mouth Daily.     • Vortioxetine HBr (Trintellix) 10 MG tablet tablet Take 1 tablet by mouth Daily. 30 tablet 2   • Vortioxetine HBr (Trintellix) 10 MG tablet tablet Take 1 tablet once a day 90 days (Patient not taking: Reported on 3/22/2023) 90 tablet 0   • [DISCONTINUED] Insulin Glargine (Lantus SoloStar) 100 UNIT/ML injection pen Inject 30 units subcutaneously At Bedtime 15 mL 2   • [DISCONTINUED] tamoxifen (NOLVADEX) 20 MG chemo tablet Take 1 tablet by mouth once a Day. 90 tablet 2     No current facility-administered medications on file prior to visit.       Allergies   Allergen Reactions   • Sulfa Antibiotics Rash and Unknown - Low Severity   • Fenofibrate Unknown - Low Severity     Past Medical History:   Diagnosis Date   • Breast cancer (HCC)    •  Diabetes mellitus (HCC)    • Disease of thyroid gland    • Hypertension      Past Surgical History:   Procedure Laterality Date   •  SECTION     • MASTECTOMY Left      Social History     Socioeconomic History   • Marital status: Legally    Tobacco Use   • Smoking status: Former     Types: Cigarettes   • Smokeless tobacco: Never   • Tobacco comments:     N/A   Vaping Use   • Vaping Use: Never used   Substance and Sexual Activity   • Alcohol use: Yes     Comment: occasionally   • Drug use: Never     Family History   Problem Relation Age of Onset   • Hypertension Mother    • Hypertension Father    • Diabetes Paternal Grandfather      Immunization History   Administered Date(s) Administered   • COVID-19 (PFIZER) PURPLE CAP 2020, 2020, 2021, 2021   • Hepatitis B 2007, 2007, 2008   • Td 2007       Tobacco Use: Medium Risk   • Smoking Tobacco Use: Former   • Smokeless Tobacco Use: Never   • Passive Exposure: Not on file       Objective     Physical Exam  Vitals and nursing note reviewed.   Constitutional:       Appearance: Normal appearance.   HENT:      Head: Normocephalic.      Nose: Nose normal.      Mouth/Throat:      Mouth: Mucous membranes are moist.   Eyes:      Pupils: Pupils are equal, round, and reactive to light.   Cardiovascular:      Rate and Rhythm: Normal rate and regular rhythm.      Pulses: Normal pulses.      Heart sounds: Normal heart sounds. No murmur heard.  Pulmonary:      Effort: Pulmonary effort is normal. No respiratory distress.      Breath sounds: Normal breath sounds.   Abdominal:      General: Bowel sounds are normal. There is no distension.      Palpations: Abdomen is soft.   Musculoskeletal:         General: Normal range of motion.      Cervical back: Normal range of motion and neck supple.   Skin:     General: Skin is warm and dry.      Capillary Refill: Capillary refill takes less than 2 seconds.   Neurological:      General:  No focal deficit present.      Mental Status: She is alert and oriented to person, place, and time.   Psychiatric:         Mood and Affect: Mood normal.         Behavior: Behavior normal.         Thought Content: Thought content normal.         Judgment: Judgment normal.         Vitals:    03/22/23 1458   BP: 131/75   Pulse: 96   Resp: 16   Temp: 98.1 °F (36.7 °C)   SpO2: 97%   Weight: 74.1 kg (163 lb 5.8 oz)   PainSc:   5   PainLoc: Back       Wt Readings from Last 3 Encounters:   03/22/23 74.1 kg (163 lb 5.8 oz)   06/07/22 72.3 kg (159 lb 6.3 oz)   12/10/21 73 kg (160 lb 15 oz)        ECOG score: 0         ECOG: (0) Fully Active - Able to Carry On All Pre-disease Performance Without Restriction  Fall Risk Assessment was completed, and patient is at low risk for falls.  PHQ-9 Total Score:         The patient is  experiencing fatigue. Fatigue score: 8    PT/OT Functional Screening: PT fx screen: No needs identified  Speech Functional Screening: Speech fx screen: No needs identified  Rehab to be ordered: Rehab to be ordered: No needs identified        Result Review :  The following data was reviewed by: ERROL Richards on 03/22/2023:  Lab Results   Component Value Date    HGB 14.9 06/18/2022    HCT 43.5 06/18/2022    MCV 91.6 06/18/2022     06/18/2022    WBC 6.39 06/18/2022    NEUTROABS 3.15 06/18/2022    LYMPHSABS 2.02 06/18/2022    MONOSABS 0.69 06/18/2022    EOSABS 0.46 (H) 06/18/2022    BASOSABS 0.05 06/18/2022     Lab Results   Component Value Date    GLUCOSE 159 (H) 06/18/2022    BUN 9 06/18/2022    CREATININE 0.75 06/18/2022     06/18/2022    K 4.2 06/18/2022     06/18/2022    CO2 24.4 06/18/2022    CALCIUM 8.6 06/18/2022    PROTEINTOT 6.4 06/18/2022    ALBUMIN 4.00 06/18/2022    BILITOT 0.4 06/18/2022    ALKPHOS 56 06/18/2022    AST 29 06/18/2022    ALT 28 06/18/2022     Lab Results   Component Value Date     08/06/2020    PRQXIHMS11 405 08/06/2020    FOLATE 18.8  08/06/2020     No results found for: IRON, LABIRON, TRANSFERRIN, TIBC  Lab Results   Component Value Date     08/06/2020    [id number] 405 08/06/2020    FOLATE 18.8 08/06/2020     No results found for: PSA, CEA, AFP, ,     Mammo Screening Modified With Tomosynthesis Right With CAD    Result Date: 3/21/2023   Benign right mammogram. Suggest routine mammographic screening.  RECOMMENDATION(S):  ROUTINE MAMMOGRAM AND CLINICAL EVALUATION IN 12 MONTHS.   BIRADS:  DIAGNOSTIC CATEGORY 2--BENIGN FINDING   BREAST COMPOSITION: Heterogeneously dense,which may obscure small masses.  PLEASE NOTE:  A NORMAL MAMMOGRAM DOES NOT EXCLUDE THE POSSIBILITY OF BREAST CANCER. ANY CLINICALLY SUSPICIOUS PALPABLE LUMP SHOULD BE BIOPSIED.      CHANTE DAVIS MD       Electronically Signed and Approved By: CHANTE DAVIS MD on 3/21/2023 at 15:33                    Assessment and Plan:  Diagnoses and all orders for this visit:    1. Malignant neoplasm of left breast in female, estrogen receptor positive, unspecified site of breast (HCC) (Primary)  -     tamoxifen (NOLVADEX) 20 MG chemo tablet; Take 1 tablet by mouth 2 (Two) Times a Day.  Dispense: 90 tablet; Refill: 1    2. Encounter for breast cancer screening using non-mammogram modality  -     MRI Breast Right With Contrast; Future    3. Bone pain  -     NM Bone Scan Whole Body; Future      Left breast IDC hormone positive and Her 2 positive on final pathology. Received chemotherapy and 1 year of Herceptin. Completed left mastectomy + radiation to the left chest wall. . Taking Tamoxifen for endocrine therapy. Tried Exemestane but did not tolerate. I don't think she has ever tried any other AI therapy besides the Exemestane due to having chronic joint pains. She is tolerating the Tamoxifen well. Completed BCI testing and did show benefit for extended endocrine therapy until around 2028.     Denies any current headaches, cough or SOA.     She does report she has right hip  bone and mid back pain for the last 6 months. We will check a bone scan to evaluate.     Will plan to check right breast MRI in 6 month since she has dense breast.     We discussed following with MD at the next visit.    Will call with bone scan results once completed.     Follow up 6 months after MRI of the right breast. Discussed BCI test results and she was given a copy of the report today. Discussed normal right mammogram results as well. Refill Tamoxifen.       I spent 25 minutes caring for Enriqueta on this date of service. This time includes time spent by me in the following activities:preparing for the visit, reviewing tests, obtaining and/or reviewing a separately obtained history, performing a medically appropriate examination and/or evaluation , counseling and educating the patient/family/caregiver, ordering medications, tests, or procedures, referring and communicating with other health care professionals , documenting information in the medical record and independently interpreting results and communicating that information with the patient/family/caregiver    Patient Follow Up: 6 months with MD.     Patient was given instructions and counseling regarding her condition or for health maintenance advice. Please see specific information pulled into the AVS if appropriate.     Debora Varma, APRN    3/22/2023    .tob

## 2023-03-23 ENCOUNTER — TELEPHONE (OUTPATIENT)
Dept: ONCOLOGY | Facility: HOSPITAL | Age: 53
End: 2023-03-23

## 2023-03-23 RX ORDER — TAMOXIFEN CITRATE 20 MG/1
20 TABLET ORAL DAILY
Qty: 90 TABLET | Refills: 3 | Status: SHIPPED | OUTPATIENT
Start: 2023-03-23

## 2023-03-23 NOTE — TELEPHONE ENCOUNTER
Caller: Enriqueta Massey    Relationship: Self    Best call back number: [phone]    What is the best time to reach you: ANYTIME    Who are you requesting to speak with (clinical staff, provider,  specific staff member): CLINICAL    What was the call regarding: PT IS NOW USING [facility]. THE TAMOXIFEN 20MG NEEDS TO BE SENT TO [facility] INSTEAD OF [facility].    Do you require a callback: YES           6 MOF with history of wheezing admitted to the PICU for presumptive status asthmaticus after patient was found wheezing, tachypneic, and with increased work of breathing in the ED.    Night prior to admission mom noticed that patient was drinking a little less, having fewer wet diapers, and was wheezing.  She brought her to PMD that morning who gave her albuterol and one dose of oral steroids which patient vomited up immediately.  She was still having increased WOB so mom brought her to the ED.    Mom denies any recent fevers, vomiting, diarrhea, congestion, or cough.  Patient has been well.  No sick contacts but patient does have 3 older sisters.      ED Course: 1x bolus, duoneb x2, CXR (negative), RVP (+ rhino/entero), methylprendisolone 15 mg x1, continuous albuterol and high flow of 6 L      PICU Course (5/24 - )    Resp: Infant continued on HFNC until she was able to be weaned to RA at 5pm 5/25. She remained stable on RA with minimal increased work of breathing. She initially received albuterol treatments q2h due to expiratory wheezing; treatments were gradually spaced out. CXR on admission showed no focal consolidation and was consistent with likely viral process or reactive airway disease. Pulmonology was consulted and recommended starting patient on budesonide BID.    FENGI: She tolerated regular diet throughout admission.    ID: RVP was positive for rhino/enterovirus. COVID negative. Elevated WBC on admission to 18.98. 6 MOF with history of wheezing admitted to the PICU for presumptive status asthmaticus after patient was found wheezing, tachypneic, and with increased work of breathing in the ED.    Night prior to admission mom noticed that patient was drinking a little less, having fewer wet diapers, and was wheezing.  She brought her to PMD that morning who gave her albuterol and one dose of oral steroids which patient vomited up immediately.  She was still having increased WOB so mom brought her to the ED.    Mom denies any recent fevers, vomiting, diarrhea, congestion, or cough.  Patient has been well.  No sick contacts but patient does have 3 older sisters.      ED Course: 1x bolus, duoneb x2, CXR (negative), RVP (+ rhino/entero), methylprendisolone 15 mg x1, continuous albuterol and high flow of 6 L      PICU Course (5/24 - )    Resp: Infant continued on HFNC until she was able to be weaned to RA at 5pm 5/25. She remained stable on RA with minimal increased work of breathing. She initially received albuterol treatments q2h due to expiratory wheezing; treatments were gradually weaned to q4h. CXR on admission showed no focal consolidation and was consistent with likely viral process or reactive airway disease. Pulmonology was consulted and recommended starting patient on budesonide BID.    FENGI: She tolerated regular diet throughout admission.    ID: RVP was positive for rhino/enterovirus. COVID negative. Elevated WBC on admission to 18.98.       Patient is stable and ready for discharge. She will follow up outpatient in next 1-2 weeks with Pediatric Pulmonologist Dr. Talley. Mom is aware and agrees with plan. 6 MOF with history of wheezing admitted to the PICU for presumptive status asthmaticus after patient was found wheezing, tachypneic, and with increased work of breathing in the ED.    Night prior to admission mom noticed that patient was drinking a little less, having fewer wet diapers, and was wheezing.  She brought her to PMD that morning who gave her albuterol and one dose of oral steroids which patient vomited up immediately.  She was still having increased WOB so mom brought her to the ED.    Mom denies any recent fevers, vomiting, diarrhea, congestion, or cough.  Patient has been well.  No sick contacts but patient does have 3 older sisters.      ED Course: 1x bolus, duoneb x2, CXR (negative), RVP (+ rhino/entero), methylprendisolone 15 mg x1, continuous albuterol and high flow of 6 L      PICU Course (5/24 - 5/26)    Resp: Infant continued on HFNC until she was able to be weaned to RA at 5pm 5/25. She remained stable on RA with minimal increased work of breathing. She initially received albuterol treatments q2h due to expiratory wheezing; treatments were gradually weaned to q4h. CXR on admission showed no focal consolidation and was consistent with likely viral process or reactive airway disease. Pulmonology was consulted and recommended starting patient on budesonide BID.    FENGI: She tolerated regular diet throughout admission.    ID: RVP was positive for rhino/enterovirus. COVID negative. Elevated WBC on admission to 18.98.     Derm: Patient was noted to have IV infiltrate of right hand on 5/25 at 5pm. IV was promptly removed. On 5/26, blistering was noted on dorsum of right hand with minimal drainage. Burn team was consulted for recommendations on care.       Patient is stable and ready for discharge. She will follow up outpatient in next 1-2 weeks with Pediatric Pulmonologist Dr. Talley. Mom is aware and agrees with plan. 6 MOF with history of wheezing admitted to the PICU for presumptive status asthmaticus after patient was found wheezing, tachypneic, and with increased work of breathing in the ED.    Night prior to admission mom noticed that patient was drinking a little less, having fewer wet diapers, and was wheezing.  She brought her to PMD that morning who gave her albuterol and one dose of oral steroids which patient vomited up immediately.  She was still having increased WOB so mom brought her to the ED.    Mom denies any recent fevers, vomiting, diarrhea, congestion, or cough.  Patient has been well.  No sick contacts but patient does have 3 older sisters.      ED Course: 1x bolus, duoneb x2, CXR (negative), RVP (+ rhino/entero), methylprendisolone 15 mg x1, continuous albuterol and high flow of 6 L      PICU Course (5/24 - 5/26)    Resp: Infant continued on HFNC until she was able to be weaned to RA at 5pm 5/25. She remained stable on RA with minimal increased work of breathing. She initially received albuterol treatments q2h due to expiratory wheezing; treatments were gradually weaned to q4h. CXR on admission showed no focal consolidation and was consistent with likely viral process or reactive airway disease. Pulmonology was consulted and recommended starting patient on budesonide BID.    FENGI: She tolerated regular diet throughout admission.    ID: RVP was positive for rhino/enterovirus. COVID negative. Elevated WBC on admission to 18.98.     Derm: Patient was noted to have IV infiltrate of right hand on 5/25 at 5pm. IV was promptly removed. On 5/26, blistering was noted on dorsum of right hand with minimal drainage. Burn team was consulted for recommendations on care. Dr. Roman came to bedside and de-roofed the blister on right hand. Bacitracin was applied and dressed with adaptic gauze. Patient to follow up in burn clinic in 1 week.       Patient is stable and ready for discharge. She will follow up outpatient in next 1-2 weeks with Pediatric Pulmonologist Dr. Talley. Mom is aware and agrees with plan.

## 2023-04-06 ENCOUNTER — HOSPITAL ENCOUNTER (EMERGENCY)
Facility: HOSPITAL | Age: 53
Discharge: HOME | End: 2023-04-06
Attending: EMERGENCY MEDICINE | Admitting: EMERGENCY MEDICINE
Payer: COMMERCIAL

## 2023-04-06 ENCOUNTER — APPOINTMENT (OUTPATIENT)
Dept: CT IMAGING | Facility: HOSPITAL | Age: 53
End: 2023-04-06
Payer: COMMERCIAL

## 2023-04-06 ENCOUNTER — APPOINTMENT (OUTPATIENT)
Dept: GENERAL RADIOLOGY | Facility: HOSPITAL | Age: 53
End: 2023-04-06
Payer: COMMERCIAL

## 2023-04-06 VITALS
HEART RATE: 95 BPM | BODY MASS INDEX: 30.18 KG/M2 | WEIGHT: 159.83 LBS | HEIGHT: 61 IN | RESPIRATION RATE: 16 BRPM | SYSTOLIC BLOOD PRESSURE: 117 MMHG | OXYGEN SATURATION: 97 % | DIASTOLIC BLOOD PRESSURE: 74 MMHG | TEMPERATURE: 99.4 F

## 2023-04-06 DIAGNOSIS — A08.4 VIRAL ENTERITIS: Primary | ICD-10-CM

## 2023-04-06 DIAGNOSIS — R11.2 NAUSEA AND VOMITING, UNSPECIFIED VOMITING TYPE: ICD-10-CM

## 2023-04-06 LAB
ACETONE BLD QL: NEGATIVE
ALBUMIN SERPL-MCNC: 4.2 G/DL (ref 3.5–5.2)
ALBUMIN/GLOB SERPL: 1.4 G/DL
ALP SERPL-CCNC: 66 U/L (ref 39–117)
ALT SERPL W P-5'-P-CCNC: 27 U/L (ref 1–33)
ANION GAP SERPL CALCULATED.3IONS-SCNC: 11.9 MMOL/L (ref 5–15)
AST SERPL-CCNC: 30 U/L (ref 1–32)
BASOPHILS # BLD AUTO: 0.03 10*3/MM3 (ref 0–0.2)
BASOPHILS NFR BLD AUTO: 0.3 % (ref 0–1.5)
BILIRUB SERPL-MCNC: 0.5 MG/DL (ref 0–1.2)
BILIRUB UR QL STRIP: NEGATIVE
BUN SERPL-MCNC: 11 MG/DL (ref 6–20)
BUN/CREAT SERPL: 15.5 (ref 7–25)
CALCIUM SPEC-SCNC: 9.1 MG/DL (ref 8.6–10.5)
CHLORIDE SERPL-SCNC: 99 MMOL/L (ref 98–107)
CLARITY UR: CLEAR
CO2 SERPL-SCNC: 26.1 MMOL/L (ref 22–29)
COLOR UR: YELLOW
CREAT SERPL-MCNC: 0.71 MG/DL (ref 0.57–1)
D-LACTATE SERPL-SCNC: 2 MMOL/L (ref 0.5–2)
D-LACTATE SERPL-SCNC: 2.5 MMOL/L (ref 0.5–2)
D-LACTATE SERPL-SCNC: 2.9 MMOL/L (ref 0.5–2)
DEPRECATED RDW RBC AUTO: 37.2 FL (ref 37–54)
EGFRCR SERPLBLD CKD-EPI 2021: 102.5 ML/MIN/1.73
EOSINOPHIL # BLD AUTO: 0.13 10*3/MM3 (ref 0–0.4)
EOSINOPHIL NFR BLD AUTO: 1.2 % (ref 0.3–6.2)
ERYTHROCYTE [DISTWIDTH] IN BLOOD BY AUTOMATED COUNT: 11.8 % (ref 12.3–15.4)
FLUAV AG NPH QL: NEGATIVE
FLUBV AG NPH QL IA: NEGATIVE
GLOBULIN UR ELPH-MCNC: 3.1 GM/DL
GLUCOSE SERPL-MCNC: 240 MG/DL (ref 65–99)
GLUCOSE UR STRIP-MCNC: ABNORMAL MG/DL
HCT VFR BLD AUTO: 43.9 % (ref 34–46.6)
HGB BLD-MCNC: 15.1 G/DL (ref 12–15.9)
HGB UR QL STRIP.AUTO: NEGATIVE
HOLD SPECIMEN: NORMAL
HOLD SPECIMEN: NORMAL
IMM GRANULOCYTES # BLD AUTO: 0.04 10*3/MM3 (ref 0–0.05)
IMM GRANULOCYTES NFR BLD AUTO: 0.4 % (ref 0–0.5)
KETONES UR QL STRIP: ABNORMAL
LEUKOCYTE ESTERASE UR QL STRIP.AUTO: NEGATIVE
LIPASE SERPL-CCNC: 38 U/L (ref 13–60)
LYMPHOCYTES # BLD AUTO: 0.83 10*3/MM3 (ref 0.7–3.1)
LYMPHOCYTES NFR BLD AUTO: 7.5 % (ref 19.6–45.3)
MCH RBC QN AUTO: 29.9 PG (ref 26.6–33)
MCHC RBC AUTO-ENTMCNC: 34.4 G/DL (ref 31.5–35.7)
MCV RBC AUTO: 86.9 FL (ref 79–97)
MONOCYTES # BLD AUTO: 0.5 10*3/MM3 (ref 0.1–0.9)
MONOCYTES NFR BLD AUTO: 4.5 % (ref 5–12)
NEUTROPHILS NFR BLD AUTO: 86.1 % (ref 42.7–76)
NEUTROPHILS NFR BLD AUTO: 9.6 10*3/MM3 (ref 1.7–7)
NITRITE UR QL STRIP: NEGATIVE
NRBC BLD AUTO-RTO: 0 /100 WBC (ref 0–0.2)
PH UR STRIP.AUTO: <=5 [PH] (ref 5–8)
PLATELET # BLD AUTO: 156 10*3/MM3 (ref 140–450)
PMV BLD AUTO: 11 FL (ref 6–12)
POTASSIUM SERPL-SCNC: 4.2 MMOL/L (ref 3.5–5.2)
PROT SERPL-MCNC: 7.3 G/DL (ref 6–8.5)
PROT UR QL STRIP: NEGATIVE
QT INTERVAL: 338 MS
RBC # BLD AUTO: 5.05 10*6/MM3 (ref 3.77–5.28)
S PYO AG THROAT QL: NEGATIVE
SARS-COV-2 RNA RESP QL NAA+PROBE: NOT DETECTED
SODIUM SERPL-SCNC: 137 MMOL/L (ref 136–145)
SP GR UR STRIP: 1.03 (ref 1–1.03)
TROPONIN T SERPL HS-MCNC: <6 NG/L
UROBILINOGEN UR QL STRIP: ABNORMAL
WBC NRBC COR # BLD: 11.13 10*3/MM3 (ref 3.4–10.8)
WHOLE BLOOD HOLD COAG: NORMAL
WHOLE BLOOD HOLD SPECIMEN: NORMAL

## 2023-04-06 PROCEDURE — 25010000002 KETOROLAC TROMETHAMINE PER 15 MG

## 2023-04-06 PROCEDURE — 87880 STREP A ASSAY W/OPTIC: CPT | Performed by: EMERGENCY MEDICINE

## 2023-04-06 PROCEDURE — U0004 COV-19 TEST NON-CDC HGH THRU: HCPCS

## 2023-04-06 PROCEDURE — 74177 CT ABD & PELVIS W/CONTRAST: CPT

## 2023-04-06 PROCEDURE — 83605 ASSAY OF LACTIC ACID: CPT | Performed by: EMERGENCY MEDICINE

## 2023-04-06 PROCEDURE — 96375 TX/PRO/DX INJ NEW DRUG ADDON: CPT

## 2023-04-06 PROCEDURE — 96374 THER/PROPH/DIAG INJ IV PUSH: CPT

## 2023-04-06 PROCEDURE — 93010 ELECTROCARDIOGRAM REPORT: CPT | Performed by: INTERNAL MEDICINE

## 2023-04-06 PROCEDURE — 71045 X-RAY EXAM CHEST 1 VIEW: CPT

## 2023-04-06 PROCEDURE — 85025 COMPLETE CBC W/AUTO DIFF WBC: CPT

## 2023-04-06 PROCEDURE — 25010000002 ONDANSETRON PER 1 MG

## 2023-04-06 PROCEDURE — 36415 COLL VENOUS BLD VENIPUNCTURE: CPT

## 2023-04-06 PROCEDURE — 84484 ASSAY OF TROPONIN QUANT: CPT

## 2023-04-06 PROCEDURE — 93005 ELECTROCARDIOGRAM TRACING: CPT | Performed by: EMERGENCY MEDICINE

## 2023-04-06 PROCEDURE — 25510000001 IOPAMIDOL PER 1 ML: Performed by: EMERGENCY MEDICINE

## 2023-04-06 PROCEDURE — 99283 EMERGENCY DEPT VISIT LOW MDM: CPT

## 2023-04-06 PROCEDURE — 80053 COMPREHEN METABOLIC PANEL: CPT

## 2023-04-06 PROCEDURE — 96361 HYDRATE IV INFUSION ADD-ON: CPT

## 2023-04-06 PROCEDURE — 83690 ASSAY OF LIPASE: CPT

## 2023-04-06 PROCEDURE — 87804 INFLUENZA ASSAY W/OPTIC: CPT

## 2023-04-06 PROCEDURE — 87081 CULTURE SCREEN ONLY: CPT | Performed by: EMERGENCY MEDICINE

## 2023-04-06 PROCEDURE — 81003 URINALYSIS AUTO W/O SCOPE: CPT

## 2023-04-06 PROCEDURE — 82009 KETONE BODYS QUAL: CPT | Performed by: EMERGENCY MEDICINE

## 2023-04-06 RX ORDER — KETOROLAC TROMETHAMINE 30 MG/ML
30 INJECTION, SOLUTION INTRAMUSCULAR; INTRAVENOUS ONCE
Status: COMPLETED | OUTPATIENT
Start: 2023-04-06 | End: 2023-04-06

## 2023-04-06 RX ORDER — SODIUM CHLORIDE 0.9 % (FLUSH) 0.9 %
10 SYRINGE (ML) INJECTION AS NEEDED
Status: DISCONTINUED | OUTPATIENT
Start: 2023-04-06 | End: 2023-04-06 | Stop reason: HOSPADM

## 2023-04-06 RX ORDER — ONDANSETRON 2 MG/ML
4 INJECTION INTRAMUSCULAR; INTRAVENOUS ONCE
Status: COMPLETED | OUTPATIENT
Start: 2023-04-06 | End: 2023-04-06

## 2023-04-06 RX ORDER — ONDANSETRON 4 MG/1
4 TABLET, ORALLY DISINTEGRATING ORAL 4 TIMES DAILY PRN
Qty: 20 TABLET | Refills: 0 | Status: SHIPPED | OUTPATIENT
Start: 2023-04-06

## 2023-04-06 RX ORDER — PROMETHAZINE HYDROCHLORIDE 25 MG/1
25 SUPPOSITORY RECTAL EVERY 6 HOURS PRN
Qty: 8 SUPPOSITORY | Refills: 0 | Status: SHIPPED | OUTPATIENT
Start: 2023-04-06

## 2023-04-06 RX ADMIN — KETOROLAC TROMETHAMINE 30 MG: 30 INJECTION, SOLUTION INTRAMUSCULAR; INTRAVENOUS at 17:49

## 2023-04-06 RX ADMIN — SODIUM CHLORIDE 1000 ML: 9 INJECTION, SOLUTION INTRAVENOUS at 16:05

## 2023-04-06 RX ADMIN — IOPAMIDOL 100 ML: 755 INJECTION, SOLUTION INTRAVENOUS at 18:20

## 2023-04-06 RX ADMIN — ONDANSETRON 4 MG: 2 INJECTION INTRAMUSCULAR; INTRAVENOUS at 17:48

## 2023-04-06 NOTE — ED PROVIDER NOTES
Time: 2:23 PM EDT  Date of encounter:  2023   Room number: 60/60    Independent Historian/Clinical History and Information was obtained by:   Patient  Chief Complaint   Patient presents with   • Headache   • Sore Throat     Pt reports HA, blurry vision, numbness in face, abd pain, vomiting started today. Reports balance issues last night. Family member just dx'd with strep       History is limited by: N/A    History of Present Illness:  Patient is a 52 y.o. year old female who presents to the emergency department for evaluation of sharp abd pain since this morning. Pt also c/o n/v since this morning and intermittent chest pain. Pt states she initially had a cough and rib pain x1 week.  She also reports sore throat, ear pain and headache.  Pt rates her pain at 10 out of 10. She has not taken any medication citing that she cannot keep anything down.  Her grandson was just recently diagnosed with strep throat. Pt is diabetic and checks her BS regularly. Denies diarrhea.      History provided by:  Patient   used: No        Patient Care Team  Primary Care Provider: Ava Li MD    Past Medical History:     Allergies   Allergen Reactions   • Sulfa Antibiotics Rash and Unknown - Low Severity   • Fenofibrate Unknown - Low Severity     Past Medical History:   Diagnosis Date   • Breast cancer    • Diabetes mellitus    • Disease of thyroid gland    • Hypertension      Past Surgical History:   Procedure Laterality Date   •  SECTION     • MASTECTOMY Left      Family History   Problem Relation Age of Onset   • Hypertension Mother    • Hypertension Father    • Diabetes Paternal Grandfather        Home Medications:  Prior to Admission medications    Medication Sig Start Date End Date Taking? Authorizing Provider   atorvastatin (LIPITOR) 20 MG tablet  21   Provider, MD Alondra   Continuous Blood Gluc Sensor (FreeStyle Carla 2 Sensor) misc Use As Directed. 22      Continuous Blood  "Gluc Sensor (FreeStyle Carla 3 Sensor) misc 1 application by Other route Every 14 (Fourteen) Days. 2/27/23      Insulin Glargine-yfgn (Semglee, yfgn,) 100 UNIT/ML solution pen-injector Inject 30 Units under the skin into the appropriate area as directed every night at bedtime. 1/26/23      Insulin Pen Needle 32G X 4 MM misc Use as Directed Up To 5 Times Daily with Insulin Administration. 12/22/22      insulin regular (NovoLIN R) 100 UNIT/ML injection up to 10 units subcutaneously three times daily with meal 90 days 2/23/23      Insulin Syringe 29G X 1/2\" 1 ML misc Use As Directed. 12/22/22      Insulin Syringe 29G X 1/2\" 1 ML misc 1 application 3 (Three) Times a Day. 2/24/23      levothyroxine (Synthroid) 50 MCG tablet 1 tab(s) orally once a day 90 days 12/22/22      Semaglutide,0.25 or 0.5MG/DOS, (Ozempic, 0.25 or 0.5 MG/DOSE,) 2 MG/1.5ML solution pen-injector 0.5 mg subcutaneously once a week 90 days 12/22/22      Semaglutide,0.25 or 0.5MG/DOS, (Ozempic, 0.25 or 0.5 MG/DOSE,) 2 MG/1.5ML solution pen-injector Inject 0.5 mg under the skin into the appropriate area as directed Every 7 (Seven) Days. 2/23/23      SITagliptin-metFORMIN HCl ER (Janumet XR) 100-1000 MG tablet Take 1 tablet by mouth Daily. 9/20/22      SITagliptin-metFORMIN HCl ER (Janumet XR) 100-1000 MG tablet 1 tab(s) orally once a day (in the evening) 90 days 12/22/22      tamoxifen (NOLVADEX) 20 MG chemo tablet Take 1 tablet by mouth Daily. 3/23/23   Debora Varma APRN   vitamin D (ERGOCALCIFEROL) 1.25 MG (53057 UT) capsule capsule  12/1/21   Provider, MD Alondra   Vitamin D, Cholecalciferol, (CHOLECALCIFEROL) 10 MCG (400 UNIT) tablet Take 400 Units by mouth Daily.    Emergency, Nurse Epic, RN   Insulin Glargine (Lantus SoloStar) 100 UNIT/ML injection pen Inject 30 units subcutaneously At Bedtime 1/19/23 1/26/23          Social History:   Social History     Tobacco Use   • Smoking status: Former     Types: Cigarettes   • Smokeless " "tobacco: Never   • Tobacco comments:     N/A   Vaping Use   • Vaping Use: Never used   Substance Use Topics   • Alcohol use: Yes     Comment: occasionally   • Drug use: Never         Review of Systems:  Review of Systems   Constitutional: Negative for chills and fever.   HENT: Positive for ear pain and sore throat. Negative for congestion.    Eyes: Negative for pain.   Respiratory: Positive for cough. Negative for chest tightness and shortness of breath.    Cardiovascular: Negative for chest pain.   Gastrointestinal: Positive for abdominal pain, nausea and vomiting. Negative for diarrhea.   Genitourinary: Negative for flank pain and hematuria.   Musculoskeletal: Positive for arthralgias. Negative for joint swelling.   Skin: Negative for pallor.   Neurological: Positive for headaches. Negative for seizures.   All other systems reviewed and are negative.       Physical Exam:  /74 (BP Location: Right arm, Patient Position: Sitting)   Pulse 95   Temp 99.4 °F (37.4 °C) (Oral)   Resp 16   Ht 154.9 cm (61\")   Wt 72.5 kg (159 lb 13.3 oz)   LMP  (LMP Unknown)   SpO2 97%   BMI 30.20 kg/m²     Physical Exam  Vitals and nursing note reviewed.   Constitutional:       General: She is not in acute distress.     Appearance: Normal appearance. She is not toxic-appearing.   HENT:      Head: Normocephalic and atraumatic.      Mouth/Throat:      Mouth: Mucous membranes are moist.      Pharynx: Uvula midline. No pharyngeal swelling or posterior oropharyngeal erythema.   Eyes:      General: No scleral icterus.  Cardiovascular:      Rate and Rhythm: Normal rate and regular rhythm.      Pulses: Normal pulses.      Heart sounds: Normal heart sounds.   Pulmonary:      Effort: Pulmonary effort is normal. No respiratory distress.      Breath sounds: Normal breath sounds. No wheezing or rhonchi.   Abdominal:      General: Abdomen is flat. Bowel sounds are normal.      Palpations: Abdomen is soft.      Tenderness: There is " abdominal tenderness (mid upper abdomen).      Comments: Normal bowel sounds in all 4 quadrants   Musculoskeletal:         General: Normal range of motion.      Cervical back: Normal range of motion and neck supple.   Skin:     General: Skin is warm and dry.   Neurological:      Mental Status: She is alert and oriented to person, place, and time. Mental status is at baseline.                  Procedures:  Procedures      Medical Decision Making:      Comorbidities that affect care:    Cancer, Diabetes, Hypertension, Thyroid Disease    External Notes reviewed:    Previous Clinic Note: I have personally reviewed patient's previous medical encounters.      The following orders were placed and all results were independently analyzed by me:  Orders Placed This Encounter   Procedures   • Influenza Antigen, Rapid - Swab, Nasopharynx   • Rapid Strep A Screen - Swab, Throat   • COVID-19,APTIMA PANTHER(JESSY),[facility], NP/OP SWAB IN UTM/VTM/[facility] TRANSPORT MEDIA,24 HR TAT - Swab, Nasal Cavity   • Beta Strep Culture, Throat - Swab, Throat   • XR Chest 1 View   • CT Abdomen Pelvis With Contrast   • [place] Draw   • Comprehensive Metabolic Panel   • Lipase   • Urinalysis With Microscopic If Indicated (No Culture) - Urine, Clean Catch   • Lactic Acid, Plasma   • CBC Auto Differential   • STAT Lactic Acid, Reflex   • Acetone   • Single High Sensitivity Troponin T   • STAT Lactic Acid, Reflex   • Undress & Gown   • Pleas re-collect lactic acid  Misc Nursing Order (Specify)   • ECG 12 Lead Chest Pain   • CBC & Differential   • Green Top (Gel)   • Lavender Top   • Gold Top - SST   • Light Blue Top       Medications Given in the Emergency Department:  Medications   sodium chloride 0.9 % bolus 1,000 mL (0 mL Intravenous Stopped 4/6/23 2041)   ketorolac (TORADOL) injection 30 mg (30 mg Intravenous Given 4/6/23 1749)   ondansetron (ZOFRAN) injection 4 mg (4 mg Intravenous Given 4/6/23 1748)   iopamidol (ISOVUE-370) 76 % injection 100  mL (100 mL Intravenous Given 4/6/23 1820)        ED Course:    The patient was initially evaluated in the triage area where orders were placed. The patient was later dispositioned by ERROL Delgadillo.      The patient was advised to stay for completion of workup which includes but is not limited to communication of labs and radiological results, reassessment and plan. The patient was advised that leaving prior to disposition by a provider could result in critical findings that are not communicated to the patient.     ED Course as of 04/07/23 1006   Thu Apr 06, 2023 2003 Rapid Strep A Screen - Swab, Throat [MS]   2003 Influenza Antigen, Rapid - Swab, Nasopharynx [MS]   2003 Pt is drinking PO fluids and tolerating well.  [MS]   2026 Lactate: 2.0  Lactic acid has returned to an acceptable values with IVF.  Patient has not had any vomiting or diarrhea while in the emergency department. Additionally, her vital signs have improved. She has remained afebrile.  Patient was given strict return instructions [MS]      ED Course User Index  [MS] Keeley Medrano APRN       Labs:    Lab Results (last 24 hours)     Procedure Component Value Units Date/Time    CBC & Differential [[id number]]  (Abnormal) Collected: 04/06/23 1437    Specimen: Blood Updated: 04/06/23 1500    Narrative:      The following orders were created for panel order CBC & Differential.  Procedure                               Abnormality         Status                     ---------                               -----------         ------                     CBC Auto Differential[[id number]]        Abnormal            Final result                 Please view results for these tests on the individual orders.    Comprehensive Metabolic Panel [[id number]]  (Abnormal) Collected: 04/06/23 1437    Specimen: Blood Updated: 04/06/23 1521     Glucose 240 mg/dL      BUN 11 mg/dL      Creatinine 0.71 mg/dL      Sodium 137 mmol/L      Potassium 4.2 mmol/L       Chloride 99 mmol/L      CO2 26.1 mmol/L      Calcium 9.1 mg/dL      Total Protein 7.3 g/dL      Albumin 4.2 g/dL      ALT (SGPT) 27 U/L      AST (SGOT) 30 U/L      Alkaline Phosphatase 66 U/L      Total Bilirubin 0.5 mg/dL      Globulin 3.1 gm/dL      A/G Ratio 1.4 g/dL      BUN/Creatinine Ratio 15.5     Anion Gap 11.9 mmol/L      eGFR 102.5 mL/min/1.73     Narrative:      GFR Normal >60  Chronic Kidney Disease <60  Kidney Failure <15      Lipase [[id number]]  (Normal) Collected: 04/06/23 1437    Specimen: Blood Updated: 04/06/23 1521     Lipase 38 U/L     Lactic Acid, Plasma [[id number]]  (Abnormal) Collected: 04/06/23 1437    Specimen: Blood Updated: 04/06/23 1534     Lactate 2.9 mmol/L     CBC Auto Differential [[id number]]  (Abnormal) Collected: 04/06/23 1437    Specimen: Blood Updated: 04/06/23 1500     WBC 11.13 10*3/mm3      RBC 5.05 10*6/mm3      Hemoglobin 15.1 g/dL      Hematocrit 43.9 %      MCV 86.9 fL      MCH 29.9 pg      MCHC 34.4 g/dL      RDW 11.8 %      RDW-SD 37.2 fl      MPV 11.0 fL      Platelets 156 10*3/mm3      Neutrophil % 86.1 %      Lymphocyte % 7.5 %      Monocyte % 4.5 %      Eosinophil % 1.2 %      Basophil % 0.3 %      Immature Grans % 0.4 %      Neutrophils, Absolute 9.60 10*3/mm3      Lymphocytes, Absolute 0.83 10*3/mm3      Monocytes, Absolute 0.50 10*3/mm3      Eosinophils, Absolute 0.13 10*3/mm3      Basophils, Absolute 0.03 10*3/mm3      Immature Grans, Absolute 0.04 10*3/mm3      nRBC 0.0 /100 WBC     Acetone [[id number]]  (Normal) Collected: 04/06/23 1437    Specimen: Blood Updated: 04/06/23 1559     Acetone Negative    Single High Sensitivity Troponin T [[id number]]  (Normal) Collected: 04/06/23 1437    Specimen: Blood Updated: 04/06/23 1624     HS Troponin T <6 ng/L     Narrative:      High Sensitive Troponin T Reference Range:  <10.0 ng/L- Negative Female for AMI  <15.0 ng/L- Negative Male for AMI  >=10 - Abnormal Female indicating possible myocardial injury.  >=15 - Abnormal  Male indicating possible myocardial injury.   Clinicians would have to utilize clinical acumen, EKG, Troponin, and serial changes to determine if it is an Acute Myocardial Infarction or myocardial injury due to an underlying chronic condition.         Urinalysis With Microscopic If Indicated (No Culture) - Urine, Clean Catch [[id number]]  (Abnormal) Collected: 04/06/23 1449    Specimen: Urine, Clean Catch Updated: 04/06/23 1505     Color, UA Yellow     Appearance, UA Clear     pH, UA <=5.0     Specific Gravity, UA 1.026     Glucose, UA >=1000 mg/dL (3+)     Ketones, UA Trace     Bilirubin, UA Negative     Blood, UA Negative     Protein, UA Negative     Leuk Esterase, UA Negative     Nitrite, UA Negative     Urobilinogen, UA 0.2 E.U./dL    Narrative:      Urine microscopic not indicated.    Influenza Antigen, Rapid - Swab, Nasopharynx [[id number]]  (Normal) Collected: 04/06/23 1450    Specimen: Swab from Nasopharynx Updated: 04/06/23 1552     Influenza A Ag, EIA Negative     Influenza B Ag, EIA Negative    Rapid Strep A Screen - Swab, Throat [[id number]]  (Normal) Collected: 04/06/23 1450    Specimen: Swab from Throat Updated: 04/06/23 1551     Strep A Ag Negative    COVID-19,APTIMA PANTHER(JESSY),[facility], NP/OP SWAB IN UTM/VTM/[facility] TRANSPORT MEDIA,24 HR TAT - Swab, Nasal Cavity [[id number]]  (Normal) Collected: 04/06/23 1450    Specimen: Swab from Nasal Cavity Updated: 04/06/23 2050     COVID19 Not Detected    Narrative:      Fact sheet for providers: https://www.fda.gov/media/195319/download     Fact sheet for patients: https://www.fda.gov/media/435345/download    Test performed by RT PCR.    Beta Strep Culture, Throat - Swab, Throat [[id number]]  (Normal) Collected: 04/06/23 1450    Specimen: Swab from Throat Updated: 04/07/23 0957     Throat Culture, Beta Strep No Beta Hemolytic Streptococcus Isolated    Narrative:      Group A Strep incidence is low in adults. Positive culture for Beta hemolytic Streptococcus  species can reflect colonization and not true infection. Please correlate clinically.    STAT Lactic Acid, Reflex [[id number]]  (Abnormal) Collected: 04/06/23 1747    Specimen: Blood Updated: 04/06/23 1845     Lactate 2.5 mmol/L      Comment: Verified by repeat analysis.       STAT Lactic Acid, Reflex [[id number]]  (Normal) Collected: 04/06/23 1954    Specimen: Blood Updated: 04/06/23 2021     Lactate 2.0 mmol/L            Imaging:    CT Abdomen Pelvis With Contrast    Result Date: 4/6/2023  PROCEDURE: CT ABDOMEN PELVIS W CONTRAST  COMPARISON: [facility], CT, CT ABDOMEN PELVIS WO CONTRAST, 2/04/2022, 13:12.  INDICATIONS: abdominal pain with vomiting and elevated lactic  TECHNIQUE: After obtaining the patient's consent, CT images were created with non-ionic intravenous contrast material.   PROTOCOL:   Standard imaging protocol performed    RADIATION:   DLP: 771.2mGy*cm   Automated exposure control was utilized to minimize radiation dose. CONTRAST: 93cc Isovue 370 I.V.  FINDINGS:  No suspicious infiltrate is seen in the lower lungs.  No pleural or pericardial effusion.  Heart size appears within normal limits.  No ectopic bowel gas is identified.  The liver appears diffusely hypodense suggesting fatty infiltration.  No splenomegaly.  The pancreas and gallbladder appear grossly unremarkable.  No suspicious adrenal lesion is seen.  No hydronephrosis.  No definite obstructing calculus is seen.  No suspicious renal lesion is identified.  Aorta appears normal in caliber.  There is mild atherosclerosis.  Mesenteric vessels appear to enhance as expected on this venous phase exam.  No significant abdominal lymphadenopathy is seen.  The stomach is minimally distended.  No definite acute focal gastric abnormality is seen.  There appears to be distention of jejunal loops in the left upper quadrant.  There is also mild wall thickening of small bowel loops in this location there does not appear to be pneumatosis or  other ectopic bowel gas seen at this time.  Distal small bowel loops are nondistended.  The appendix appears within normal limits.  No acute colonic abnormality is seen.  There is mild diverticulosis.  No acute rectal abnormality.  No pelvic free fluid.  The uterus and adnexa appear within normal limits.  Urinary bladder appears grossly unremarkable.  No definite pelvic lymphadenopathy.  No acute osseous abnormality is identified.        1. Distended loops of jejunum with wall thickening.  This is nonspecific but could be seen with an acute enteritis.  No definite ectopic bowel gas or other definite CT findings of bowel ischemia are identified at this time.  Correlate clinically. 2. Fatty infiltration of the liver.     NANY LENNON MD       Electronically Signed and Approved By: NANY LENNON MD on 4/06/2023 at 19:24             XR Chest 1 View    Result Date: 4/6/2023  PROCEDURE: XR CHEST 1 VW  COMPARISON: [facility], CR, CHEST PA/AP & LAT 2V, 12/17/2020, 14:22.  INDICATIONS: Chest pain.  FINDINGS:  LUNGS: Normal.  No significant pulmonary parenchymal abnormalities.  VASCULATURE: Normal.  Unremarkable pulmonary vasculature.  CARDIAC: Normal.  No cardiac silhouette abnormality or cardiomegaly.  MEDIASTINUM: Normal.  No visible mass or adenopathy.  PLEURA: Normal.  No effusion or pleural thickening.  BONES: Normal.  No fracture or visible bony lesion.  OTHER: Surgical clips are noted along the left hemithorax.       No acute cardiopulmonary process identified.       KYRA SOTO MD       Electronically Signed and Approved By: KYRA SOTO MD on 4/06/2023 at 16:41                 Differential Diagnosis and Discussion:      Abdominal Pain: Based on the patient's signs and symptoms, I considered abdominal aortic aneurysm, small bowel obstruction, pancreatitis, acute cholecystitis, acute appendecitis, peptic ulcer disease, gastritis, colitis, endocrine disorders, irritable bowel syndrome  and other differential diagnosis an etiology of the patient's abdominal pain.        MDM  Number of Diagnoses or Management Options  Nausea and vomiting, unspecified vomiting type: new and does not require workup  Viral enteritis: new and does not require workup  Diagnosis management comments: Patient is a 52-year-old female that presented to the emergency department today with abdominal pain, headache, sore throat, nausea, and vomiting.  She also reports intermittent chest pain and rib pain x1 week.  She advises that there has been members in her family that have also been ill.  She was tender on palpation to the mid abdominal area.  A CT was completed and showed patient to have inflammation of gastro tract.  Her labs did show that she had a slightly elevated lactic acid which improved with IV fluids.  Her WBC was barely elevated however it is likely thought to be related to her vomiting.  Patient had no additional vomiting episodes since arriving here in the emergency department, additionally her vital signs improved while in the department.  Patient confirms that she does have a primary care provider, as well as her oncologist, that she can follow-up with if her symptoms return.  Patient was given strict return instructions and verbalized understanding.  I have spoke with the patient and I have explained the patient´s condition, diagnoses and treatment plan based on the information available to me at this time. I have answered all questions and addressed any concerns. The patient has a good understanding of the patient´s diagnosis, condition, and treatment plan as can be expected at this point. The vital signs have been stable. The patient´s condition is stable and appropriate for discharge from the emergency department.      The patient will pursue further outpatient evaluation with the primary care physician or other designated or consulting physician as outlined in the discharge instructions. They are  agreeable to this plan of care and follow-up instructions have been explained in detail. The patient has received these instructions in written format and have expressed an understanding of the discharge instructions. The patient is aware that any significant change in condition or worsening of symptoms should prompt an immediate return to this or the closest emergency department or call to 911.         Amount and/or Complexity of Data Reviewed  Clinical lab tests: ordered and reviewed  Tests in the radiology section of CPT®: ordered and reviewed  Decide to obtain previous medical records or to obtain history from someone other than the patient: yes  Review and summarize past medical records: yes (I have personally reviewed patient's previous medical encounters.)    Risk of Complications, Morbidity, and/or Mortality  Presenting problems: moderate  Diagnostic procedures: moderate  Management options: moderate    Patient Progress  Patient progress: stable           Patient Care Considerations:          Consultants/Shared Management Plan:    None    Social Determinants of Health:    Patient is independent, reliable, and has access to care.       Disposition and Care Coordination:    Discharged: The patient is suitable and stable for discharge with no need for consideration of observation or admission.        Final diagnoses:   Viral enteritis   Nausea and vomiting, unspecified vomiting type        ED Disposition     ED Disposition   Discharge    Condition   Stable    Comment   --             This medical record created using voice recognition software.    Documentation assistance provided by Halley Amor acting as scribe for ERROL Delgadillo. Information recorded by the scribe was done at my direction and has been verified and validated by me.            Halley Amor  04/06/23 4620       Keeley Medrano APRN  04/07/23 1008

## 2023-04-06 NOTE — ED NOTES
Pt states she started coughing 1 week ago and is now having nausea and vomiting since this morning, ribs, chest and back are hurting

## 2023-04-07 NOTE — DISCHARGE INSTRUCTIONS
If you continue to have abdominal pain, as well as nausea and vomiting, please follow-up with your primary care provider or your oncologist. Your CT showed nothing emergent that would require immediate intervention.    It is important that you continue to increase your oral fluid intake particularly water and an electrolyte substance such as Powerade and Gatorade.  Please take the antinausea medication prescribed today as needed.  If it anytime your abdominal pain increases, if you develop severe nausea and vomiting and diarrhea that you cannot control, if you notice any blood to your stools or emesis, or if you develop chest pain that is persistent with shortness of air please return to the emergency department otherwise follow-up with your PCP or oncologist.

## 2023-04-08 LAB — BACTERIA SPEC AEROBE CULT: NORMAL

## 2023-04-19 ENCOUNTER — HOSPITAL ENCOUNTER (OUTPATIENT)
Dept: NUCLEAR MEDICINE | Facility: HOSPITAL | Age: 53
Discharge: HOME | End: 2023-04-19
Payer: COMMERCIAL

## 2023-04-19 DIAGNOSIS — M89.8X9 BONE PAIN: ICD-10-CM

## 2023-04-19 PROCEDURE — 0 TECHNETIUM MEDRONATE KIT: Performed by: NURSE PRACTITIONER

## 2023-04-19 PROCEDURE — A9503 TC99M MEDRONATE: HCPCS | Performed by: NURSE PRACTITIONER

## 2023-04-19 PROCEDURE — 78306 BONE IMAGING WHOLE BODY: CPT

## 2023-04-19 RX ORDER — TC 99M MEDRONATE 20 MG/10ML
22.1 INJECTION, POWDER, LYOPHILIZED, FOR SOLUTION INTRAVENOUS
Status: COMPLETED | OUTPATIENT
Start: 2023-04-19 | End: 2023-04-19

## 2023-04-19 RX ADMIN — TC 99M MEDRONATE 22.1 MILLICURIE: 20 INJECTION, POWDER, LYOPHILIZED, FOR SOLUTION INTRAVENOUS at 13:01

## 2023-09-15 ENCOUNTER — HOSPITAL ENCOUNTER (OUTPATIENT)
Dept: MRI IMAGING | Facility: HOSPITAL | Age: 53
Discharge: HOME | End: 2023-09-15
Admitting: NURSE PRACTITIONER
Payer: COMMERCIAL

## 2023-09-15 DIAGNOSIS — Z12.39 ENCOUNTER FOR BREAST CANCER SCREENING USING NON-MAMMOGRAM MODALITY: ICD-10-CM

## 2023-09-15 PROCEDURE — A9577 INJ MULTIHANCE: HCPCS | Performed by: NURSE PRACTITIONER

## 2023-09-15 PROCEDURE — 77049 MRI BREAST C-+ W/CAD BI: CPT

## 2023-09-15 PROCEDURE — 0 GADOBENATE DIMEGLUMINE 529 MG/ML SOLUTION: Performed by: NURSE PRACTITIONER

## 2023-09-15 RX ADMIN — GADOBENATE DIMEGLUMINE 15 ML: 529 INJECTION, SOLUTION INTRAVENOUS at 13:56

## 2023-09-22 ENCOUNTER — LAB (OUTPATIENT)
Dept: ONCOLOGY | Facility: HOSPITAL | Age: 53
End: 2023-09-22
Payer: COMMERCIAL

## 2023-09-22 ENCOUNTER — OFFICE VISIT (OUTPATIENT)
Dept: ONCOLOGY | Facility: HOSPITAL | Age: 53
End: 2023-09-22
Payer: COMMERCIAL

## 2023-09-22 VITALS
BODY MASS INDEX: 30.47 KG/M2 | OXYGEN SATURATION: 100 % | SYSTOLIC BLOOD PRESSURE: 121 MMHG | RESPIRATION RATE: 18 BRPM | DIASTOLIC BLOOD PRESSURE: 81 MMHG | HEIGHT: 61 IN | HEART RATE: 93 BPM | TEMPERATURE: 97.7 F | WEIGHT: 161.38 LBS

## 2023-09-22 DIAGNOSIS — Z17.0 MALIGNANT NEOPLASM OF LEFT BREAST IN FEMALE, ESTROGEN RECEPTOR POSITIVE, UNSPECIFIED SITE OF BREAST: ICD-10-CM

## 2023-09-22 DIAGNOSIS — C50.912 MALIGNANT NEOPLASM OF LEFT BREAST IN FEMALE, ESTROGEN RECEPTOR POSITIVE, UNSPECIFIED SITE OF BREAST: Primary | ICD-10-CM

## 2023-09-22 DIAGNOSIS — C50.912 MALIGNANT NEOPLASM OF LEFT BREAST IN FEMALE, ESTROGEN RECEPTOR POSITIVE, UNSPECIFIED SITE OF BREAST: ICD-10-CM

## 2023-09-22 DIAGNOSIS — Z17.0 MALIGNANT NEOPLASM OF LEFT BREAST IN FEMALE, ESTROGEN RECEPTOR POSITIVE, UNSPECIFIED SITE OF BREAST: Primary | ICD-10-CM

## 2023-09-22 LAB
ALBUMIN SERPL-MCNC: 4 G/DL (ref 3.5–5.2)
ALBUMIN/GLOB SERPL: 1.5 G/DL
ALP SERPL-CCNC: 62 U/L (ref 39–117)
ALT SERPL W P-5'-P-CCNC: 22 U/L (ref 1–33)
ANION GAP SERPL CALCULATED.3IONS-SCNC: 11 MMOL/L (ref 5–15)
AST SERPL-CCNC: 20 U/L (ref 1–32)
BASOPHILS # BLD AUTO: 0.06 10*3/MM3 (ref 0–0.2)
BASOPHILS NFR BLD AUTO: 0.8 % (ref 0–1.5)
BILIRUB SERPL-MCNC: 0.2 MG/DL (ref 0–1.2)
BUN SERPL-MCNC: 9 MG/DL (ref 6–20)
BUN/CREAT SERPL: 10.8 (ref 7–25)
CALCIUM SPEC-SCNC: 9.1 MG/DL (ref 8.6–10.5)
CHLORIDE SERPL-SCNC: 101 MMOL/L (ref 98–107)
CO2 SERPL-SCNC: 27 MMOL/L (ref 22–29)
CREAT SERPL-MCNC: 0.83 MG/DL (ref 0.57–1)
DEPRECATED RDW RBC AUTO: 39.3 FL (ref 37–54)
EGFRCR SERPLBLD CKD-EPI 2021: 84.4 ML/MIN/1.73
EOSINOPHIL # BLD AUTO: 0.35 10*3/MM3 (ref 0–0.4)
EOSINOPHIL NFR BLD AUTO: 4.4 % (ref 0.3–6.2)
ERYTHROCYTE [DISTWIDTH] IN BLOOD BY AUTOMATED COUNT: 12 % (ref 12.3–15.4)
GLOBULIN UR ELPH-MCNC: 2.6 GM/DL
GLUCOSE SERPL-MCNC: 239 MG/DL (ref 65–99)
HCT VFR BLD AUTO: 39.4 % (ref 34–46.6)
HGB BLD-MCNC: 13.3 G/DL (ref 12–15.9)
IMM GRANULOCYTES # BLD AUTO: 0.02 10*3/MM3 (ref 0–0.05)
IMM GRANULOCYTES NFR BLD AUTO: 0.3 % (ref 0–0.5)
LYMPHOCYTES # BLD AUTO: 2.25 10*3/MM3 (ref 0.7–3.1)
LYMPHOCYTES NFR BLD AUTO: 28.6 % (ref 19.6–45.3)
MCH RBC QN AUTO: 30.2 PG (ref 26.6–33)
MCHC RBC AUTO-ENTMCNC: 33.8 G/DL (ref 31.5–35.7)
MCV RBC AUTO: 89.3 FL (ref 79–97)
MONOCYTES # BLD AUTO: 0.65 10*3/MM3 (ref 0.1–0.9)
MONOCYTES NFR BLD AUTO: 8.3 % (ref 5–12)
NEUTROPHILS NFR BLD AUTO: 4.54 10*3/MM3 (ref 1.7–7)
NEUTROPHILS NFR BLD AUTO: 57.6 % (ref 42.7–76)
NRBC BLD AUTO-RTO: 0 /100 WBC (ref 0–0.2)
PLATELET # BLD AUTO: 141 10*3/MM3 (ref 140–450)
PMV BLD AUTO: 11.6 FL (ref 6–12)
POTASSIUM SERPL-SCNC: 3.9 MMOL/L (ref 3.5–5.2)
PROT SERPL-MCNC: 6.6 G/DL (ref 6–8.5)
RBC # BLD AUTO: 4.41 10*6/MM3 (ref 3.77–5.28)
SODIUM SERPL-SCNC: 139 MMOL/L (ref 136–145)
WBC NRBC COR # BLD: 7.87 10*3/MM3 (ref 3.4–10.8)

## 2023-09-22 PROCEDURE — G0463 HOSPITAL OUTPT CLINIC VISIT: HCPCS | Performed by: INTERNAL MEDICINE

## 2023-09-22 PROCEDURE — 85025 COMPLETE CBC W/AUTO DIFF WBC: CPT

## 2023-09-22 PROCEDURE — 80053 COMPREHEN METABOLIC PANEL: CPT

## 2023-09-22 PROCEDURE — 36415 COLL VENOUS BLD VENIPUNCTURE: CPT

## 2023-09-22 RX ORDER — CIPROFLOXACIN 500 MG/1
TABLET, FILM COATED ORAL
COMMUNITY
Start: 2023-07-29

## 2023-09-22 RX ORDER — TAMOXIFEN CITRATE 20 MG/1
20 TABLET ORAL DAILY
Qty: 90 TABLET | Refills: 3 | Status: SHIPPED | OUTPATIENT
Start: 2023-09-22

## 2023-09-22 NOTE — PROGRESS NOTES
Chief Complaint/Care Team   breast cancer    Ava Li MD Movania, Jawed M, MD    History of Present Illness     Diagnosis: Left Breast Cancer, diagnosed 8/17/2016    Current Treatment: Tamoxifen 20mg PO QD  Previous Treatment: s/p left mastectomy, previously on Exemestane 25mg PO QD not tolerated      Enriqueta Massey is a 53 y.o. female who presents to [facility] for follow up regarding Breast Cancer.    Ms. Enriqueta Massey presents for 6 month follow up for left breast cancer diagnosed in 8/17/2016 with IDC, hormone receptor positive and Her 2 positive on final path. Received chemotherapy and Herceptin and radiation for chest wall invasion. Completed left mastectomy. Still has native right breast and receives mammogram and alternates with MRI's.     Currently, taking Tamoxifen. She did try Aromasin previously and did not tolerate. She has history of arthralgia and did not try the other AI therapy. Tolerates Tamoxifen well. Recently had BCI testing ordered at the last visit and testing did show she will benefit for extended endocrine therapy with plans to continue endocrine therapy until 2028.     She does report she did see the  and did not show any genetic abnormalities noted.     Completed mammogram on 3/2023 which was benign.     She does completed of right hip pain and mid back pain for the last 6 months.     Follows with her PCP for lab work.     Works as an RN.     Interval history: Patient here toxicity check, currently taking tamoxifen.  Patient reports hot flashes, night sweats that are manageable, patient with pain in left upper arm near shoulder present for the past week, patient able to perform her ADLs and IADLs.  Patient denies any swelling in that arm.  Patient scheduled to stop tamoxifen in 2028.  Overall patient is tolerating tamoxifen well.  Last mammogram in March 2023 was negative.      Review of Systems   Musculoskeletal:  Positive for  "arthralgias and back pain.   All other systems reviewed and are negative.     Oncology/Hematology History    No history exists.       Objective     Vitals:    09/22/23 1401   BP: 121/81   Pulse: 93   Resp: 18   Temp: 97.7 °F (36.5 °C)   TempSrc: Temporal   SpO2: 100%   Weight: 73.2 kg (161 lb 6 oz)   Height: 154.9 cm (60.98\")   PainSc:   7   PainLoc: Back  Comment: hips     ECOG score: 0         PHQ-9 Total Score:         Physical Exam  Vitals reviewed. Exam conducted with a chaperone present.   Constitutional:       General: She is not in acute distress.     Appearance: Normal appearance.   HENT:      Head: Normocephalic and atraumatic.   Eyes:      Extraocular Movements: Extraocular movements intact.      Conjunctiva/sclera: Conjunctivae normal.   Pulmonary:      Effort: Pulmonary effort is normal.   Musculoskeletal:      Cervical back: Normal range of motion and neck supple.   Skin:     General: Skin is warm and dry.      Findings: No bruising.   Neurological:      Mental Status: She is oriented to person, place, and time.         Past Medical History     Past Medical History:   Diagnosis Date    Breast cancer     Diabetes mellitus     Disease of thyroid gland     Hypertension      Current Outpatient Medications on File Prior to Visit   Medication Sig Dispense Refill    albuterol sulfate HFA (Ventolin HFA) 108 (90 Base) MCG/ACT inhaler Inhale 2 puffs 4 (Four) Times a Day. 18 g 2    atorvastatin (LIPITOR) 20 MG tablet 1 tab(s) orally once a day in EVENING 90 days 90 tablet 0    ciprofloxacin (CIPRO) 500 MG tablet       Continuous Blood Gluc Sensor (FreeStyle Carla 3 Sensor) misc 1 application by Other route Every 14 (Fourteen) Days. 6 each 3    Cyanocobalamin (B-12) 1000 MCG tablet 1 tab(s) orally once a day 30 day(s) 30 tablet 2    Insulin Glargine-yfgn (Semglee, yfgn,) 100 UNIT/ML solution pen-injector Inject 30 Units under the skin into the appropriate area as directed every night at bedtime. 15 mL 2    Insulin " "Pen Needle 32G X 4 MM misc Inject 1 application under the skin into the appropriate area as directed up to 5 (Five) Times a Day As Needed. 150 each 2    insulin regular (NovoLIN R) 100 UNIT/ML injection Inject Up To 10 Units under the skin into the appropriate area as directed 3 (Three) Times a Day With Meals. 120 mL 0    Insulin Syringe (TRUEplus Insulin Syringe) 29G X 1/2\" 1 ML misc Take As Directed 3 times a day 100 each 11    Insulin Syringe 29G X 1/2\" 1 ML misc 1 application 3 (Three) Times a Day. 180 each 3    levothyroxine (Synthroid) 50 MCG tablet Take 1 tablet by mouth Daily. 90 tablet 0    Omega-3 Fatty Acids (fish oil) 500 MG capsule capsule Take 2 capsules by mouth Daily. 60 capsule 2    ondansetron ODT (ZOFRAN-ODT) 4 MG disintegrating tablet Place 1 tablet on the tongue 4 (Four) Times a Day As Needed for Nausea or Vomiting. 20 tablet 0    promethazine (PHENERGAN) 25 MG suppository Insert 1 suppository into the rectum Every 6 (Six) Hours As Needed for Nausea or Vomiting. 8 suppository 0    Semaglutide, 2 MG/DOSE, (OZEMPIC) 8 MG/3ML solution pen-injector Inject 2 mg under the skin into the appropriate area as directed Every 7 (Seven) Days. 3 mL 2    SITagliptin-metFORMIN HCl ER (Janumet XR) 100-1000 MG tablet Take 1 tablet by mouth Every Evening. 90 tablet 0    vitamin D (ERGOCALCIFEROL) 1.25 MG (46077 UT) capsule capsule       vitamin D (ERGOCALCIFEROL) 1.25 MG (24964 UT) capsule capsule Take 1 capsule by mouth 2 (Two) Times a Week. 25 capsule 0    [DISCONTINUED] atorvastatin (LIPITOR) 20 MG tablet       [DISCONTINUED] atorvastatin (LIPITOR) 20 MG tablet Take 1 tablet by mouth once daily in the evening 90 tablet 0    [DISCONTINUED] Continuous Blood Gluc Sensor (FreeStyle Carla 2 Sensor) misc Use As Directed. 2 each 2    [DISCONTINUED] Insulin Glargine-yfgn (Semglee, yfgn,) 100 UNIT/ML solution pen-injector Inject 30 Units under the skin into the appropriate area as directed every night at bedtime. 15 mL " "2    [DISCONTINUED] Insulin Glargine-yfgn (Semglee, yfgn,) 100 UNIT/ML solution pen-injector Inject 30u subcutaneously At Bedtime 15 mL 2    [DISCONTINUED] Insulin Pen Needle 32G X 4 MM misc Use as Directed Up To 5 Times Daily with Insulin Administration. 150 each 2    [DISCONTINUED] insulin regular (NovoLIN R) 100 UNIT/ML injection Inject up to 10 Units under the skin into the appropriate area as directed 3 (Three) Times a Day. 90 mL 0    [DISCONTINUED] Insulin Syringe 29G X 1/2\" 1 ML misc Use As Directed. 10 each 11    [DISCONTINUED] levothyroxine (Synthroid) 50 MCG tablet 1 tab(s) orally once a day 90 days 90 tablet 0    [DISCONTINUED] levothyroxine (Synthroid) 50 MCG tablet Take 1 tablet by mouth once daily 90 tablet 0    [DISCONTINUED] Semaglutide, 1 MG/DOSE, (Ozempic, 1 MG/DOSE,) 4 MG/3ML solution pen-injector Inject 1mg under the skin into the appropriate area as directed once weekly 12 mL 0    [DISCONTINUED] Semaglutide, 1 MG/DOSE, (Ozempic, 1 MG/DOSE,) 4 MG/3ML solution pen-injector Inject 1 mg under the skin into the appropriate area as directed Every 7 (Seven) Days. 12 mL 0    [DISCONTINUED] Semaglutide,0.25 or 0.5MG/DOS, (Ozempic, 0.25 or 0.5 MG/DOSE,) 2 MG/1.5ML solution pen-injector 0.5 mg subcutaneously once a week 90 days 1.5 mL 0    [DISCONTINUED] Semaglutide,0.25 or 0.5MG/DOS, (Ozempic, 0.25 or 0.5 MG/DOSE,) 2 MG/1.5ML solution pen-injector Inject 0.5 mg under the skin into the appropriate area as directed Every 7 (Seven) Days. 16 mL 0    [DISCONTINUED] Semaglutide,0.25 or 0.5MG/DOS, (Ozempic, 0.25 or 0.5 MG/DOSE,) 2 MG/1.5ML solution pen-injector 1 mg subcutaneously once a week 30 day(s) 6 mL 0    [DISCONTINUED] SITagliptin-metFORMIN HCl ER (Janumet XR) 100-1000 MG tablet Take 1 tablet by mouth Daily. 90 tablet 0    [DISCONTINUED] SITagliptin-metFORMIN HCl ER (Janumet XR) 100-1000 MG tablet 1 tab(s) orally once a day (in the evening) 90 days 90 tablet 0    [DISCONTINUED] SITagliptin-metFORMIN HCl " ER (Janumet XR) 100-1000 MG tablet Take 1 tablet by mouth once daily in the evening 90 tablet 0    [DISCONTINUED] tamoxifen (NOLVADEX) 20 MG chemo tablet Take 1 tablet by mouth Daily. 90 tablet 3    [DISCONTINUED] vitamin D (ERGOCALCIFEROL) 1.25 MG (37803 UT) capsule capsule Take 1 capsule by mouth twice a week 25 capsule 0    [DISCONTINUED] Vitamin D, Cholecalciferol, (CHOLECALCIFEROL) 10 MCG (400 UNIT) tablet Take 1 tablet by mouth Daily.      [DISCONTINUED] Insulin Glargine (Lantus SoloStar) 100 UNIT/ML injection pen Inject 30 units subcutaneously At Bedtime 15 mL 2     No current facility-administered medications on file prior to visit.      Allergies   Allergen Reactions    Sulfa Antibiotics Rash and Unknown - Low Severity    Fenofibrate Unknown - Low Severity     Past Surgical History:   Procedure Laterality Date     SECTION      MASTECTOMY Left      Social History     Socioeconomic History    Marital status: Legally    Tobacco Use    Smoking status: Former     Types: Cigarettes    Smokeless tobacco: Never    Tobacco comments:     N/A   Vaping Use    Vaping Use: Never used   Substance and Sexual Activity    Alcohol use: Yes     Comment: occasionally    Drug use: Never     Family History   Problem Relation Age of Onset    Hypertension Mother     Hypertension Father     Diabetes Paternal Grandfather        Results     Result Review   The following data was reviewed by: Ye Flores MD on 2023:  Lab Results   Component Value Date    HGB 13.3 2023    HCT 39.4 2023    MCV 89.3 2023     2023    WBC 7.87 2023    NEUTROABS 4.54 2023    LYMPHSABS 2.25 2023    MONOSABS 0.65 2023    EOSABS 0.35 2023    BASOSABS 0.06 2023     Lab Results   Component Value Date    GLUCOSE 239 (H) 2023    BUN 9 2023    CREATININE 0.83 2023     2023    K 3.9 2023     2023    CO2 27.0 2023    CALCIUM  9.1 09/22/2023    PROTEINTOT 6.6 09/22/2023    ALBUMIN 4.0 09/22/2023    BILITOT 0.2 09/22/2023    ALKPHOS 62 09/22/2023    AST 20 09/22/2023    ALT 22 09/22/2023     No results found for: MG, PHOS, FREET4, TSH        No radiology results for the last day  MRI Breast Bilateral With & Without Contrast    Result Date: 9/15/2023  Impression: Right breast:  No MRI evidence of malignancy.  Left mastectomy.  BIRADS: DIAGNOSTIC CATEGORY 1--NEGATIVE.   RECOMMENDATION(S): ROUTINE annual MAMMOGRAM AND CLINICAL EVALUATION .    PLEASE NOTE:  A NORMAL MRI DOES NOT EXCLUDE THE POSSIBILITY OF BREAST CANCER.  A CLINICALLY SUSPICIOUS PALPABLE LUMP SHOULD BE BIOPSIED.      Kalani Lopez M.D.       Electronically Signed and Approved By: Kalani Lopez M.D. on 9/15/2023 at 15:22              Assessment & Plan     Diagnoses and all orders for this visit:    1. Malignant neoplasm of left breast in female, estrogen receptor positive, unspecified site of breast (Primary)  -     CBC & Differential; Future  -     Comprehensive Metabolic Panel; Future  -     CBC & Differential; Future  -     Comprehensive Metabolic Panel; Future  -     tamoxifen (NOLVADEX) 20 MG chemo tablet; Take 1 tablet by mouth Daily.  Dispense: 90 tablet; Refill: 3        Enriqueta Massey is a 53 y.o. female who presents to [facility] for toxicity check regarding tamoxifen for Left Breast Cancer, diagnosed 8/17/2016, s/p left mastectomy, previously on Exemestane 25mg PO QD not tolerated.    -- Recommend patient continue and mammogram, due March 2024  - Recommend continue tamoxifen 20 mg p.o. once daily, provided refill today  -Patient with some muscle skeletal pain in left upper extremity, only present for 1 week, also with some minor pain in her hip, both or not impeding her ability to perform her daily activities, but recommend patient follow-up PCP if pain in left upper extremity or hips worsens.  -Patient has contact me  if she has worsening symptoms while on tamoxifen for earlier appointment.    -Ordered CBC and CMP to assess for toxicity and labs from her tamoxifen.    Plan patient follow-up in 6 months with CBC and CMP.    Electronically signed by Ye Flores MD, 09/22/23, 5:07 PM EDT.        Follow Up     I spent 30 minutes caring for Enriqueta on this date of service. This time includes time spent by me in the following activities:preparing for the visit, reviewing tests, obtaining and/or reviewing a separately obtained history, performing a medically appropriate examination and/or evaluation , counseling and educating the patient/family/caregiver, ordering medications, tests, or procedures, referring and communicating with other health care professionals , documenting information in the medical record, independently interpreting results and communicating that information with the patient/family/caregiver, and care coordination.    This is an acute or chronic illness that poses a threat to life or bodily function. The above treatment plan involves a high risk of complications and/or mortality of patient management.    The patient was seen and examined. Work by the provider also included review and/or ordering of lab tests, review and/or ordering of radiology tests, review and/or ordering of medicine tests, discussion with other physicians or providers, independent review of data, obtaining old records, review/summation of old records, and/or other review.    I have reviewed the family history, social history, and past medical history for this patient. Previous information and data has been reviewed and updated as needed. I have reviewed and verified the chief complaint, history, and other documentation. The patient was interviewed and examined in the clinic and the chart reviewed. The previous observations, recommendations, and conclusions were reviewed including those of other providers.     The plan was discussed with the  patient and/or family. The patient was given time to ask questions and these questions were answered. At the conclusion of their visit they had no additional questions or concerns and all questions were answered to their satisfaction.    Patient was given instructions and counseling regarding her condition or for health maintenance advice. Please see specific information pulled into the AVS if appropriate.

## 2023-10-02 PROCEDURE — 87186 SC STD MICRODIL/AGAR DIL: CPT

## 2023-10-02 PROCEDURE — 87086 URINE CULTURE/COLONY COUNT: CPT

## 2023-10-02 PROCEDURE — 87088 URINE BACTERIA CULTURE: CPT

## 2024-03-22 ENCOUNTER — OFFICE VISIT (OUTPATIENT)
Dept: ONCOLOGY | Facility: HOSPITAL | Age: 54
End: 2024-03-22
Payer: COMMERCIAL

## 2024-03-22 ENCOUNTER — LAB (OUTPATIENT)
Dept: ONCOLOGY | Facility: HOSPITAL | Age: 54
End: 2024-03-22
Payer: COMMERCIAL

## 2024-03-22 VITALS
WEIGHT: 163.36 LBS | TEMPERATURE: 97.9 F | BODY MASS INDEX: 30.84 KG/M2 | DIASTOLIC BLOOD PRESSURE: 90 MMHG | RESPIRATION RATE: 18 BRPM | HEART RATE: 93 BPM | SYSTOLIC BLOOD PRESSURE: 147 MMHG | HEIGHT: 61 IN | OXYGEN SATURATION: 100 %

## 2024-03-22 DIAGNOSIS — Z17.0 MALIGNANT NEOPLASM OF LEFT BREAST IN FEMALE, ESTROGEN RECEPTOR POSITIVE, UNSPECIFIED SITE OF BREAST: ICD-10-CM

## 2024-03-22 DIAGNOSIS — Z17.0 MALIGNANT NEOPLASM OF LEFT BREAST IN FEMALE, ESTROGEN RECEPTOR POSITIVE, UNSPECIFIED SITE OF BREAST: Primary | ICD-10-CM

## 2024-03-22 DIAGNOSIS — Z12.31 ENCOUNTER FOR SCREENING MAMMOGRAM FOR MALIGNANT NEOPLASM OF BREAST: ICD-10-CM

## 2024-03-22 DIAGNOSIS — C50.912 MALIGNANT NEOPLASM OF LEFT BREAST IN FEMALE, ESTROGEN RECEPTOR POSITIVE, UNSPECIFIED SITE OF BREAST: Primary | ICD-10-CM

## 2024-03-22 DIAGNOSIS — C50.912 MALIGNANT NEOPLASM OF LEFT BREAST IN FEMALE, ESTROGEN RECEPTOR POSITIVE, UNSPECIFIED SITE OF BREAST: ICD-10-CM

## 2024-03-22 DIAGNOSIS — Z12.39 ENCOUNTER FOR BREAST CANCER SCREENING USING NON-MAMMOGRAM MODALITY: ICD-10-CM

## 2024-03-22 LAB
ALBUMIN SERPL-MCNC: 4.4 G/DL (ref 3.5–5.2)
ALBUMIN/GLOB SERPL: 1.7 G/DL
ALP SERPL-CCNC: 69 U/L (ref 39–117)
ALT SERPL W P-5'-P-CCNC: 32 U/L (ref 1–33)
ANION GAP SERPL CALCULATED.3IONS-SCNC: 10.4 MMOL/L (ref 5–15)
AST SERPL-CCNC: 32 U/L (ref 1–32)
BASOPHILS # BLD AUTO: 0.04 10*3/MM3 (ref 0–0.2)
BASOPHILS NFR BLD AUTO: 0.5 % (ref 0–1.5)
BILIRUB SERPL-MCNC: 0.4 MG/DL (ref 0–1.2)
BUN SERPL-MCNC: 10 MG/DL (ref 6–20)
BUN/CREAT SERPL: 13.5 (ref 7–25)
CALCIUM SPEC-SCNC: 9.3 MG/DL (ref 8.6–10.5)
CHLORIDE SERPL-SCNC: 100 MMOL/L (ref 98–107)
CO2 SERPL-SCNC: 26.6 MMOL/L (ref 22–29)
CREAT SERPL-MCNC: 0.74 MG/DL (ref 0.57–1)
DEPRECATED RDW RBC AUTO: 39.4 FL (ref 37–54)
EGFRCR SERPLBLD CKD-EPI 2021: 96.9 ML/MIN/1.73
EOSINOPHIL # BLD AUTO: 0.44 10*3/MM3 (ref 0–0.4)
EOSINOPHIL NFR BLD AUTO: 5.8 % (ref 0.3–6.2)
ERYTHROCYTE [DISTWIDTH] IN BLOOD BY AUTOMATED COUNT: 12 % (ref 12.3–15.4)
GLOBULIN UR ELPH-MCNC: 2.6 GM/DL
GLUCOSE SERPL-MCNC: 189 MG/DL (ref 65–99)
HCT VFR BLD AUTO: 41.5 % (ref 34–46.6)
HGB BLD-MCNC: 14.4 G/DL (ref 12–15.9)
IMM GRANULOCYTES # BLD AUTO: 0.01 10*3/MM3 (ref 0–0.05)
IMM GRANULOCYTES NFR BLD AUTO: 0.1 % (ref 0–0.5)
LYMPHOCYTES # BLD AUTO: 2.88 10*3/MM3 (ref 0.7–3.1)
LYMPHOCYTES NFR BLD AUTO: 37.8 % (ref 19.6–45.3)
MCH RBC QN AUTO: 30.6 PG (ref 26.6–33)
MCHC RBC AUTO-ENTMCNC: 34.7 G/DL (ref 31.5–35.7)
MCV RBC AUTO: 88.1 FL (ref 79–97)
MONOCYTES # BLD AUTO: 0.62 10*3/MM3 (ref 0.1–0.9)
MONOCYTES NFR BLD AUTO: 8.1 % (ref 5–12)
NEUTROPHILS NFR BLD AUTO: 3.63 10*3/MM3 (ref 1.7–7)
NEUTROPHILS NFR BLD AUTO: 47.7 % (ref 42.7–76)
PLATELET # BLD AUTO: 144 10*3/MM3 (ref 140–450)
PMV BLD AUTO: 10.6 FL (ref 6–12)
POTASSIUM SERPL-SCNC: 4 MMOL/L (ref 3.5–5.2)
PROT SERPL-MCNC: 7 G/DL (ref 6–8.5)
RBC # BLD AUTO: 4.71 10*6/MM3 (ref 3.77–5.28)
SODIUM SERPL-SCNC: 137 MMOL/L (ref 136–145)
WBC NRBC COR # BLD AUTO: 7.62 10*3/MM3 (ref 3.4–10.8)

## 2024-03-22 PROCEDURE — 36415 COLL VENOUS BLD VENIPUNCTURE: CPT

## 2024-03-22 PROCEDURE — G0463 HOSPITAL OUTPT CLINIC VISIT: HCPCS | Performed by: INTERNAL MEDICINE

## 2024-03-22 PROCEDURE — 80053 COMPREHEN METABOLIC PANEL: CPT

## 2024-03-22 PROCEDURE — 85025 COMPLETE CBC W/AUTO DIFF WBC: CPT

## 2024-03-22 RX ORDER — TAMOXIFEN CITRATE 20 MG/1
20 TABLET ORAL DAILY
Qty: 90 TABLET | Refills: 3 | Status: SHIPPED | OUTPATIENT
Start: 2024-03-22

## 2024-03-22 NOTE — PROGRESS NOTES
Chief Complaint/Care Team   FOLLOW UP 1 - BREAST CA    Ava Li MD Movania, Jawed M, MD    History of Present Illness     Diagnosis: Left Breast Cancer, diagnosed 8/17/2016    Current Treatment: Tamoxifen 20mg PO QD, due to stop in 6/2028    Previous Treatment: s/p left mastectomy, previously on Exemestane 25mg PO QD not tolerated began around 6/2018      Enriqueta Massey is a 53 y.o. female who presents to [facility] HEMATOLOGY & ONCOLOGY for follow up regarding Breast Cancer.    Ms. Enriqueta Massey presents for 6 month follow up for left breast cancer diagnosed in 8/17/2016 with IDC, hormone receptor positive and Her 2 positive on final path. Received chemotherapy and Herceptin and radiation for chest wall invasion. Completed left mastectomy. Still has native right breast and receives mammogram and alternates with MRI's.     Currently, taking Tamoxifen. She did try Aromasin previously and did not tolerate. She has history of arthralgia and did not try the other AI therapy. Tolerates Tamoxifen well. Recently had BCI testing ordered at the last visit and testing did show she will benefit for extended endocrine therapy with plans to continue endocrine therapy until 2028.     She does report she did see the  and did not show any genetic abnormalities noted.     Completed mammogram on 3/2023 which was benign.     She does completed of right hip pain and mid back pain for the last 6 months.     Follows with her PCP for lab work.     Works as an RN.     Interval history: Patient here toxicity check, currently taking tamoxifen.  Patient continues to report hot flashes, night sweats that are manageable.  Patient denies any swelling in that arm.  Patient scheduled to stop tamoxifen in 6/2028.  Overall patient is tolerating tamoxifen well.  Last mammogram in March 2023 was negative. No report of vaginal bleeding.       Review of Systems   Musculoskeletal:  Positive for arthralgias and back  "pain.   All other systems reviewed and are negative.       Oncology/Hematology History    No history exists.       Objective     Vitals:    03/22/24 1137   BP: 147/90   Pulse: 93   Resp: 18   Temp: 97.9 °F (36.6 °C)   TempSrc: Oral   SpO2: 100%   Weight: 74.1 kg (163 lb 5.8 oz)   Height: 154.9 cm (60.98\")   PainSc: 0-No pain       ECOG score: 0         PHQ-9 Total Score:         Physical Exam  Vitals reviewed. Exam conducted with a chaperone present.   Constitutional:       General: She is not in acute distress.     Appearance: Normal appearance.   HENT:      Head: Normocephalic and atraumatic.   Eyes:      Extraocular Movements: Extraocular movements intact.      Conjunctiva/sclera: Conjunctivae normal.   Pulmonary:      Effort: Pulmonary effort is normal.   Musculoskeletal:      Cervical back: Normal range of motion and neck supple.   Skin:     General: Skin is warm and dry.      Findings: No bruising.   Neurological:      Mental Status: She is oriented to person, place, and time.           Past Medical History     Past Medical History:   Diagnosis Date    Breast cancer     Diabetes mellitus     Disease of thyroid gland     Hypertension      Current Outpatient Medications on File Prior to Visit   Medication Sig Dispense Refill    albuterol sulfate HFA (Ventolin HFA) 108 (90 Base) MCG/ACT inhaler Inhale 2 puffs 4 (Four) Times a Day. 18 g 2    atorvastatin (LIPITOR) 20 MG tablet 1 tab(s) orally once a day in EVENING 90 days 90 tablet 0    Continuous Blood Gluc Sensor (FreeStyle Carla 3 Sensor) misc Change as directed every 14 days 6 each 0    Cyanocobalamin (B-12) 1000 MCG tablet 1 tab(s) orally once a day 30 day(s) 30 tablet 2    ibuprofen (ADVIL,MOTRIN) 800 MG tablet Take 1 tablet by mouth Every 8 (Eight) Hours As Needed for Mild Pain or Moderate Pain. 15 tablet 0    Insulin Glargine-yfgn (Semglee, yfgn,) 100 UNIT/ML solution pen-injector Inject 30 Units under the skin into the appropriate area as directed every " "night at bedtime. 15 mL 2    Insulin Pen Needle 32G X 4 MM misc Inject 1 application under the skin into the appropriate area as directed up to 5 (Five) Times a Day As Needed. 150 each 2    insulin regular (NovoLIN R) 100 UNIT/ML injection Inject Up To 10 Units under the skin into the appropriate area as directed 3 (Three) Times a Day With Meals. 120 mL 0    Insulin Syringe (TRUEplus Insulin Syringe) 29G X 1/2\" 1 ML misc Take As Directed 3 times a day 100 each 11    Insulin Syringe 29G X 1/2\" 1 ML misc 1 application 3 (Three) Times a Day. 180 each 3    levothyroxine (Synthroid) 50 MCG tablet Take 1 tablet by mouth Daily. 90 tablet 0    ondansetron ODT (ZOFRAN-ODT) 4 MG disintegrating tablet Place 1 tablet on the tongue 4 (Four) Times a Day As Needed for Nausea or Vomiting. 20 tablet 0    Semaglutide, 2 MG/DOSE, (Ozempic, 2 MG/DOSE,) 8 MG/3ML solution pen-injector Inject 2 mg subcutaneously once a week 3 mL 2    SITagliptin-metFORMIN HCl ER (Janumet XR) 100-1000 MG tablet Take 1 tablet by mouth Every Evening. 90 tablet 0    vitamin D (ERGOCALCIFEROL) 1.25 MG (06491 UT) capsule capsule Take 1 capsule by mouth 2 (Two) Times a Week. 25 capsule 0    [DISCONTINUED] tamoxifen (NOLVADEX) 20 MG chemo tablet Take 1 tablet by mouth Daily. 90 tablet 3    [DISCONTINUED] Insulin Glargine (Lantus SoloStar) 100 UNIT/ML injection pen Inject 30 units subcutaneously At Bedtime 15 mL 2     No current facility-administered medications on file prior to visit.      Allergies   Allergen Reactions    Sulfa Antibiotics Rash and Unknown - Low Severity    Fenofibrate Unknown - Low Severity     Past Surgical History:   Procedure Laterality Date     SECTION      MASTECTOMY Left      Social History     Socioeconomic History    Marital status: Legally    Tobacco Use    Smoking status: Former     Types: Cigarettes    Smokeless tobacco: Never    Tobacco comments:     N/A   Vaping Use    Vaping status: Never Used   Substance and " "Sexual Activity    Alcohol use: Yes     Comment: occasionally    Drug use: Never    Sexual activity: Defer     Family History   Problem Relation Age of Onset    Hypertension Mother     Hypertension Father     Diabetes Paternal Grandfather        Results     Result Review   The following data was reviewed by: Ye Flores MD on 09/22/2023:  Lab Results   Component Value Date    HGB 14.4 03/22/2024    HCT 41.5 03/22/2024    MCV 88.1 03/22/2024     03/22/2024    WBC 7.62 03/22/2024    NEUTROABS 3.63 03/22/2024    LYMPHSABS 2.88 03/22/2024    MONOSABS 0.62 03/22/2024    EOSABS 0.44 (H) 03/22/2024    BASOSABS 0.04 03/22/2024     Lab Results   Component Value Date    GLUCOSE 189 (H) 03/22/2024    BUN 10 03/22/2024    CREATININE 0.74 03/22/2024     03/22/2024    K 4.0 03/22/2024     03/22/2024    CO2 26.6 03/22/2024    CALCIUM 9.3 03/22/2024    PROTEINTOT 7.0 03/22/2024    ALBUMIN 4.4 03/22/2024    BILITOT 0.4 03/22/2024    ALKPHOS 69 03/22/2024    AST 32 03/22/2024    ALT 32 03/22/2024     No results found for: \"MG\", \"PHOS\", \"FREET4\", \"TSH\"        No radiology results for the last day  XR Foot 3+ View Right    Result Date: 3/14/2024  Impression:  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  No acute abnormality.      TONG TORRES MD       Electronically Signed and Approved By: TONG TORRES MD on 3/14/2024 at 15:34              Assessment & Plan     Diagnoses and all orders for this visit:    1. Malignant neoplasm of left breast in female, estrogen receptor positive, unspecified site of breast (Primary)  -     Mammo screening modified with tomosynthesis right w CAD; Future  -     MRI Breast Bilateral Diagnostic W WO Contrast; Future  -     tamoxifen (NOLVADEX) 20 MG chemo tablet; Take 1 tablet by mouth Daily.  Dispense: 90 tablet; Refill: 3    2. Encounter for breast cancer screening using non-mammogram modality  -     Mammo screening modified with tomosynthesis right w CAD; Future  -     MRI Breast " Bilateral Diagnostic W WO Contrast; Future    3. Encounter for screening mammogram for malignant neoplasm of breast  -     Mammo screening modified with tomosynthesis right w CAD; Future  -     MRI Breast Bilateral Diagnostic W WO Contrast; Future          Enriqueta Massey is a 53 y.o. female who presents to [facility] HEMATOLOGY & ONCOLOGY for toxicity check regarding tamoxifen for Left Breast Cancer, diagnosed 8/17/2016, s/p left mastectomy, previously on Exemestane 25mg PO QD not tolerated which began around 6/2018. Pt due to stop tamoxifen in 6/2028.    -- Recommend patient continue and mammogram, due March 2024, placed orders for this today  - Recommend continue tamoxifen 20 mg p.o. once daily, provided refill today  -Discussed results of most recent CBC and CMP, which were normal  -recommend pt continue tamoxifen  -Patient knows to contact me if she has worsening symptoms while on tamoxifen for earlier appointment.    Plan patient follow-up in 1 month with mammogram      Please note that portions of this note were completed with a voice recognition program.      Electronically signed by Ye Flores MD, 03/22/24, 12:59 PM EDT.        Follow Up     I spent 30 minutes caring for Enriqueta on this date of service. This time includes time spent by me in the following activities:preparing for the visit, reviewing tests, obtaining and/or reviewing a separately obtained history, performing a medically appropriate examination and/or evaluation , counseling and educating the patient/family/caregiver, ordering medications, tests, or procedures, referring and communicating with other health care professionals , documenting information in the medical record, independently interpreting results and communicating that information with the patient/family/caregiver, and care coordination.    This is an acute or chronic illness that poses a threat to life or bodily function. The above treatment plan involves a  high risk of complications and/or mortality of patient management.    The patient was seen and examined. Work by the provider also included review and/or ordering of lab tests, review and/or ordering of radiology tests, review and/or ordering of medicine tests, discussion with other physicians or providers, independent review of data, obtaining old records, review/summation of old records, and/or other review.    I have reviewed the family history, social history, and past medical history for this patient. Previous information and data has been reviewed and updated as needed. I have reviewed and verified the chief complaint, history, and other documentation. The patient was interviewed and examined in the clinic and the chart reviewed. The previous observations, recommendations, and conclusions were reviewed including those of other providers.     The plan was discussed with the patient and/or family. The patient was given time to ask questions and these questions were answered. At the conclusion of their visit they had no additional questions or concerns and all questions were answered to their satisfaction.    Patient was given instructions and counseling regarding her condition or for health maintenance advice. Please see specific information pulled into the AVS if appropriate.

## 2024-04-04 ENCOUNTER — HOSPITAL ENCOUNTER (OUTPATIENT)
Dept: MAMMOGRAPHY | Facility: HOSPITAL | Age: 54
Discharge: HOME | End: 2024-04-04
Admitting: INTERNAL MEDICINE
Payer: COMMERCIAL

## 2024-04-04 DIAGNOSIS — C50.912 MALIGNANT NEOPLASM OF LEFT BREAST IN FEMALE, ESTROGEN RECEPTOR POSITIVE, UNSPECIFIED SITE OF BREAST: ICD-10-CM

## 2024-04-04 DIAGNOSIS — Z12.39 ENCOUNTER FOR BREAST CANCER SCREENING USING NON-MAMMOGRAM MODALITY: ICD-10-CM

## 2024-04-04 DIAGNOSIS — Z12.31 ENCOUNTER FOR SCREENING MAMMOGRAM FOR MALIGNANT NEOPLASM OF BREAST: ICD-10-CM

## 2024-04-04 DIAGNOSIS — Z17.0 MALIGNANT NEOPLASM OF LEFT BREAST IN FEMALE, ESTROGEN RECEPTOR POSITIVE, UNSPECIFIED SITE OF BREAST: ICD-10-CM

## 2024-04-04 PROCEDURE — 77067 SCR MAMMO BI INCL CAD: CPT

## 2024-04-04 PROCEDURE — 77063 BREAST TOMOSYNTHESIS BI: CPT

## 2024-04-22 NOTE — PROGRESS NOTES
Chief Complaint/Care Team    BREAST CA    Ava Li MD Movania, Jawed M, MD    History of Present Illness     Diagnosis: Left Breast Cancer, diagnosed 8/17/2016    Current Treatment: Tamoxifen 20mg PO QD, due to stop in 6/2028    Previous Treatment: s/p left mastectomy, previously on Exemestane 25mg PO QD not tolerated began around 6/2018      Enriqueta Massey is a 53 y.o. female who presents to [facility] HEMATOLOGY & ONCOLOGY for follow up regarding Breast Cancer.    Ms. Enriqueta Massey presents for 6 month follow up for left breast cancer diagnosed in 8/17/2016 with IDC, hormone receptor positive and Her 2 positive on final path. Received chemotherapy and Herceptin and radiation for chest wall invasion. Completed left mastectomy. Still has native right breast and receives mammogram and alternates with MRI's.     Currently, taking Tamoxifen. She did try Aromasin previously and did not tolerate. She has history of arthralgia and did not try the other AI therapy. Tolerates Tamoxifen well. Recently had BCI testing ordered at the last visit and testing did show she will benefit for extended endocrine therapy with plans to continue endocrine therapy until 2028.     She does report she did see the  and did not show any genetic abnormalities noted.     Completed mammogram on 3/2023 which was benign.     She does completed of right hip pain and mid back pain for the last 6 months.     Follows with her PCP for lab work.     Works as an RN.     Interval history: Patient here for toxicity check, currently taking tamoxifen.  Patient reports hot flashes, night sweats that are manageable. Patient scheduled to stop tamoxifen in 6/2028.  Overall patient is tolerating tamoxifen well. No report of vaginal bleeding. Pt here to discuss results of most recent mammogram.      Review of Systems   Constitutional:  Negative for appetite change, diaphoresis, fatigue, fever, unexpected weight gain and  "unexpected weight loss.   HENT:  Negative for hearing loss, mouth sores, sore throat, swollen glands, trouble swallowing and voice change.    Eyes:  Negative for blurred vision.   Respiratory:  Negative for cough, shortness of breath and wheezing.    Cardiovascular:  Negative for chest pain and palpitations.   Gastrointestinal:  Negative for abdominal pain, blood in stool, constipation, diarrhea, nausea and vomiting.   Endocrine: Negative for cold intolerance and heat intolerance.   Genitourinary:  Negative for difficulty urinating, dysuria, frequency, hematuria and urinary incontinence.   Musculoskeletal:  Positive for arthralgias and back pain. Negative for myalgias.   Skin:  Negative for rash, skin lesions and wound.   Neurological:  Negative for dizziness, seizures, weakness, numbness and headache.   Hematological:  Does not bruise/bleed easily.   Psychiatric/Behavioral:  Negative for depressed mood. The patient is not nervous/anxious.    All other systems reviewed and are negative.       Oncology/Hematology History    No history exists.       Objective     Vitals:    04/23/24 0902   BP: 134/81   Pulse: 84   Resp: 18   Temp: 97.7 °F (36.5 °C)   TempSrc: Temporal   SpO2: 98%   Weight: 74.8 kg (164 lb 14.5 oz)   Height: 154.9 cm (60.98\")   PainSc: 0-No pain         ECOG score: 0         PHQ-9 Total Score:         Physical Exam  Vitals reviewed. Exam conducted with a chaperone present.   Constitutional:       General: She is not in acute distress.     Appearance: Normal appearance.   HENT:      Head: Normocephalic and atraumatic.   Eyes:      Extraocular Movements: Extraocular movements intact.      Conjunctiva/sclera: Conjunctivae normal.   Pulmonary:      Effort: Pulmonary effort is normal.   Musculoskeletal:      Cervical back: Normal range of motion and neck supple.   Skin:     General: Skin is warm and dry.      Findings: No bruising.   Neurological:      Mental Status: She is oriented to person, place, and " "time.           Past Medical History     Past Medical History:   Diagnosis Date    Breast cancer     Diabetes mellitus     Disease of thyroid gland     Hypertension      Current Outpatient Medications on File Prior to Visit   Medication Sig Dispense Refill    albuterol sulfate HFA (Ventolin HFA) 108 (90 Base) MCG/ACT inhaler Inhale 2 puffs 4 (Four) Times a Day. 18 g 2    atorvastatin (LIPITOR) 20 MG tablet 1 tab(s) orally once a day in EVENING 90 days 90 tablet 0    Continuous Blood Gluc Sensor (Dexcom G6 Sensor) 1 Application by Other route Every 10 (Ten) Days. 3 each 0    Continuous Blood Gluc Sensor (FreeStyle Carla 3 Sensor) misc Change as directed every 14 days 6 each 0    Continuous Blood Gluc Transmit (Dexcom G6 Transmitter) misc Apply to abdominal, Arm or Buttock 90 days 1 each 2    Cyanocobalamin (B-12) 1000 MCG tablet 1 tab(s) orally once a day 30 day(s) 30 tablet 2    ibuprofen (ADVIL,MOTRIN) 800 MG tablet Take 1 tablet by mouth Every 8 (Eight) Hours As Needed for Mild Pain or Moderate Pain. 15 tablet 0    Insulin Glargine-yfgn (Semglee, yfgn,) 100 UNIT/ML solution pen-injector Inject 30 Units under the skin into the appropriate area as directed every night at bedtime. 15 mL 2    Insulin Pen Needle 32G X 4 MM misc Inject 1 application under the skin into the appropriate area as directed up to 5 (Five) Times a Day As Needed. 150 each 2    insulin regular (NovoLIN R) 100 UNIT/ML injection Inject Up To 10 Units under the skin into the appropriate area as directed 3 (Three) Times a Day With Meals. 120 mL 0    Insulin Syringe (TRUEplus Insulin Syringe) 29G X 1/2\" 1 ML misc Take As Directed 3 times a day 100 each 11    Insulin Syringe 29G X 1/2\" 1 ML misc 1 application 3 (Three) Times a Day. 180 each 3    levothyroxine (Synthroid) 50 MCG tablet Take 1 tablet by mouth Daily. 90 tablet 0    ondansetron ODT (ZOFRAN-ODT) 4 MG disintegrating tablet Place 1 tablet on the tongue 4 (Four) Times a Day As Needed for " Nausea or Vomiting. 20 tablet 0    Semaglutide, 2 MG/DOSE, (Ozempic, 2 MG/DOSE,) 8 MG/3ML solution pen-injector Inject 2 mg subcutaneously once a week 3 mL 2    SITagliptin-metFORMIN HCl ER (Janumet XR) 100-1000 MG tablet Take 1 tablet by mouth Every Evening. 90 tablet 0    tamoxifen (NOLVADEX) 20 MG chemo tablet Take 1 tablet by mouth Daily. 90 tablet 3    vitamin D (ERGOCALCIFEROL) 1.25 MG (82144 UT) capsule capsule Take 1 capsule by mouth 2 (Two) Times a Week. 25 capsule 0    [DISCONTINUED] Insulin Glargine (Lantus SoloStar) 100 UNIT/ML injection pen Inject 30 units subcutaneously At Bedtime 15 mL 2     No current facility-administered medications on file prior to visit.      Allergies   Allergen Reactions    Sulfa Antibiotics Rash and Unknown - Low Severity    Fenofibrate Unknown - Low Severity     Past Surgical History:   Procedure Laterality Date     SECTION      MASTECTOMY Left      Social History     Socioeconomic History    Marital status: Legally    Tobacco Use    Smoking status: Former     Types: Cigarettes    Smokeless tobacco: Never    Tobacco comments:     N/A   Vaping Use    Vaping status: Never Used   Substance and Sexual Activity    Alcohol use: Yes     Comment: occasionally    Drug use: Never    Sexual activity: Defer     Family History   Problem Relation Age of Onset    Hypertension Mother     Hypertension Father     Diabetes Paternal Grandfather        Results     Result Review   The following data was reviewed by: Ye Flores MD on 2023:  Lab Results   Component Value Date    HGB 14.4 2024    HCT 41.5 2024    MCV 88.1 2024     2024    WBC 7.62 2024    NEUTROABS 3.63 2024    LYMPHSABS 2.88 2024    MONOSABS 0.62 2024    EOSABS 0.44 (H) 2024    BASOSABS 0.04 2024     Lab Results   Component Value Date    GLUCOSE 189 (H) 2024    BUN 10 2024    CREATININE 0.74 2024     2024    K  "4.0 03/22/2024     03/22/2024    CO2 26.6 03/22/2024    CALCIUM 9.3 03/22/2024    PROTEINTOT 7.0 03/22/2024    ALBUMIN 4.4 03/22/2024    BILITOT 0.4 03/22/2024    ALKPHOS 69 03/22/2024    AST 32 03/22/2024    ALT 32 03/22/2024     No results found for: \"MG\", \"PHOS\", \"FREET4\", \"TSH\"        No radiology results for the last day  Mammo Screening Modified With Tomosynthesis Right With CAD    Result Date: 4/5/2024  Impression: Benign findings. No mammographic evidence of malignancy. Recommend routine mammographic screening.  BI-RADS ASSESSMENT: BI-RADS 2. Benign findings.  The patient's information is entered into a computerized reminder system with a targeted due date for the next mammogram.  Note:  It has been reported that there is approximately a 15% false negative rate in mammography.  Therefore, management of a palpable abnormality should not be deferred because of a negative mammogram.     Electronically Signed By-CHANTE DAVIS MD On:4/5/2024 8:39 AM       Assessment & Plan     Diagnoses and all orders for this visit:    1. Malignant neoplasm of left breast in female, estrogen receptor positive, unspecified site of breast (Primary)  -     CBC & Differential; Future  -     Comprehensive Metabolic Panel; Future            Enriqueta Massey is a 53 y.o. female who presents to [facility] HEMATOLOGY [facility] ONCOLOGY for toxicity check regarding tamoxifen for Left Breast Cancer, diagnosed 8/17/2016, s/p left mastectomy, previously on Exemestane 25mg PO QD not tolerated which began around 6/2018. Pt due to stop tamoxifen in 6/2028.    -- Recommend patient continue with annual mammogram, completed in March 2024 was benign, due again in 3/2025  -plan to alternate MRI Breast with mammogram every 6 months, she is due for this in 9/2024, placed orders for this today  --Discussed results of most recent CBC and CMP, which were normal  - Recommend she continue tamoxifen 20 mg p.o. once daily, provided refill " today  -Patient knows to contact me if she has worsening symptoms while on tamoxifen for earlier appointment.    Plan patient follow-up in 6 months with Breast MRI and labs.      Please note that portions of this note were completed with a voice recognition program.    Electronically signed by Ye Flores MD, 04/23/24, 9:40 AM EDT.      Follow Up     I spent 30 minutes caring for Enriqueta on this date of service. This time includes time spent by me in the following activities:preparing for the visit, reviewing tests, obtaining and/or reviewing a separately obtained history, performing a medically appropriate examination and/or evaluation , counseling and educating the patient/family/caregiver, ordering medications, tests, or procedures, referring and communicating with other health care professionals , documenting information in the medical record, independently interpreting results and communicating that information with the patient/family/caregiver, and care coordination.    This is an acute or chronic illness that poses a threat to life or bodily function. The above treatment plan involves a high risk of complications and/or mortality of patient management.    The patient was seen and examined. Work by the provider also included review and/or ordering of lab tests, review and/or ordering of radiology tests, review and/or ordering of medicine tests, discussion with other physicians or providers, independent review of data, obtaining old records, review/summation of old records, and/or other review.    I have reviewed the family history, social history, and past medical history for this patient. Previous information and data has been reviewed and updated as needed. I have reviewed and verified the chief complaint, history, and other documentation. The patient was interviewed and examined in the clinic and the chart reviewed. The previous observations, recommendations, and conclusions were reviewed including those  of other providers.     The plan was discussed with the patient and/or family. The patient was given time to ask questions and these questions were answered. At the conclusion of their visit they had no additional questions or concerns and all questions were answered to their satisfaction.    Patient was given instructions and counseling regarding her condition or for health maintenance advice. Please see specific information pulled into the AVS if appropriate.

## 2024-04-23 ENCOUNTER — OFFICE VISIT (OUTPATIENT)
Dept: ONCOLOGY | Facility: HOSPITAL | Age: 54
End: 2024-04-23
Payer: COMMERCIAL

## 2024-04-23 VITALS
HEIGHT: 61 IN | WEIGHT: 164.9 LBS | SYSTOLIC BLOOD PRESSURE: 134 MMHG | BODY MASS INDEX: 31.13 KG/M2 | DIASTOLIC BLOOD PRESSURE: 81 MMHG | RESPIRATION RATE: 18 BRPM | HEART RATE: 84 BPM | TEMPERATURE: 97.7 F | OXYGEN SATURATION: 98 %

## 2024-04-23 DIAGNOSIS — C50.912 MALIGNANT NEOPLASM OF LEFT BREAST IN FEMALE, ESTROGEN RECEPTOR POSITIVE, UNSPECIFIED SITE OF BREAST: Primary | ICD-10-CM

## 2024-04-23 DIAGNOSIS — Z17.0 MALIGNANT NEOPLASM OF LEFT BREAST IN FEMALE, ESTROGEN RECEPTOR POSITIVE, UNSPECIFIED SITE OF BREAST: Primary | ICD-10-CM

## 2024-04-23 PROCEDURE — G0463 HOSPITAL OUTPT CLINIC VISIT: HCPCS | Performed by: INTERNAL MEDICINE

## 2024-04-23 PROCEDURE — 99214 OFFICE O/P EST MOD 30 MIN: CPT | Performed by: INTERNAL MEDICINE

## 2024-06-19 ENCOUNTER — TELEPHONE (OUTPATIENT)
Dept: ONCOLOGY | Facility: HOSPITAL | Age: 54
End: 2024-06-19

## 2024-06-19 NOTE — TELEPHONE ENCOUNTER
"Caller: Enriqueta Massey \"JAROD\"     Relationship: [unfilled]     Best call back number: [phone]    What is your medical concern? PATIENT IS EXPERIENCING EXTREME ITCHING & PAIN UNDER HER RIGHT NIPPLE & IS CONCERNED, INQUIRING IF SHE SHOULD COME IN TO SEE DR. NEVAREZ, PLEASE ADVISE?  ALSO WOULD LIKE THE NAME OF THE [facility]    Is your provider already aware of this issue? NO    Have you been treated for this issue? NO      "

## 2024-06-20 ENCOUNTER — OFFICE VISIT (OUTPATIENT)
Dept: ONCOLOGY | Facility: HOSPITAL | Age: 54
End: 2024-06-20
Payer: COMMERCIAL

## 2024-06-20 VITALS
HEIGHT: 61 IN | BODY MASS INDEX: 30.55 KG/M2 | SYSTOLIC BLOOD PRESSURE: 125 MMHG | TEMPERATURE: 98.3 F | WEIGHT: 161.8 LBS | RESPIRATION RATE: 18 BRPM | OXYGEN SATURATION: 99 % | DIASTOLIC BLOOD PRESSURE: 81 MMHG | HEART RATE: 93 BPM

## 2024-06-20 DIAGNOSIS — C50.912 MALIGNANT NEOPLASM OF LEFT BREAST IN FEMALE, ESTROGEN RECEPTOR POSITIVE, UNSPECIFIED SITE OF BREAST: Primary | ICD-10-CM

## 2024-06-20 DIAGNOSIS — N64.59 INVERSION OF RIGHT NIPPLE: ICD-10-CM

## 2024-06-20 DIAGNOSIS — Z17.0 MALIGNANT NEOPLASM OF LEFT BREAST IN FEMALE, ESTROGEN RECEPTOR POSITIVE, UNSPECIFIED SITE OF BREAST: Primary | ICD-10-CM

## 2024-06-20 PROCEDURE — G0463 HOSPITAL OUTPT CLINIC VISIT: HCPCS | Performed by: INTERNAL MEDICINE

## 2024-06-20 RX ORDER — NYSTATIN 100000 U/G
1 CREAM TOPICAL 2 TIMES DAILY
Qty: 30 G | Refills: 1 | Status: SHIPPED | OUTPATIENT
Start: 2024-06-20

## 2024-06-20 NOTE — PROGRESS NOTES
Chief Complaint/Care Team   Malignant neoplasm of left breast in female, estrogen recep    Provider, No Known  Ava Li MD    History of Present Illness     Diagnosis: Left Breast Cancer, diagnosed 8/17/2016    Current Treatment: Tamoxifen 20mg PO QD, due to stop in 6/2028    Previous Treatment: s/p left mastectomy by Dr. Carmichael at [facility], previously on Exemestane 25mg PO QD not tolerated began around 6/2018      Enriqueta Massey is a 53 y.o. female who presents to [facility] HEMATOLOGY & ONCOLOGY for follow up regarding Breast Cancer.    Ms. Enriqueta Massey presents for 6 month follow up for left breast cancer diagnosed in 8/17/2016 with IDC, hormone receptor positive and Her 2 positive on final path. Received chemotherapy and Herceptin and radiation for chest wall invasion. Completed left mastectomy, right breast intact and receives mammogram and alternates with MRI's.     Currently, taking Tamoxifen. She did try Aromasin previously and did not tolerate. She has history of arthralgia and did not try the other AIs. Tolerates Tamoxifen well. Recently had BCI testing ordered at the last visit and testing did show she will benefit for extended endocrine therapy with plans to continue endocrine therapy until 2028.     She does report she did see the  and did not show any genetic abnormalities noted.     Follows with her PCP for lab work.     Works as an RN.     Interval history: Patient here for toxicity check, currently taking tamoxifen.  Patient reports hot flashes, night sweats that are manageable. Patient scheduled to stop tamoxifen in 6/2028.  Overall patient is tolerating tamoxifen well. No report of vaginal bleeding. Pt reports right nipple retraction, reports itching and pain under right breast.       Review of Systems   Constitutional:  Negative for appetite change, diaphoresis, fatigue, fever, unexpected weight gain and unexpected weight loss.   HENT:  Negative for hearing  "loss, mouth sores, sore throat, swollen glands, trouble swallowing and voice change.    Eyes:  Negative for blurred vision.   Respiratory:  Negative for cough, shortness of breath and wheezing.    Cardiovascular:  Negative for chest pain and palpitations.   Gastrointestinal:  Negative for abdominal pain, blood in stool, constipation, diarrhea, nausea and vomiting.   Endocrine: Negative for cold intolerance and heat intolerance.   Genitourinary:  Positive for breast pain (itching). Negative for difficulty urinating, dysuria, frequency, hematuria and urinary incontinence.   Musculoskeletal:  Positive for arthralgias and back pain. Negative for myalgias.   Skin:  Negative for rash, skin lesions and wound.   Neurological:  Negative for dizziness, seizures, weakness, numbness and headache.   Hematological:  Does not bruise/bleed easily.   Psychiatric/Behavioral:  Negative for depressed mood. The patient is not nervous/anxious.    All other systems reviewed and are negative.       Oncology/Hematology History    No history exists.       Objective     Vitals:    06/20/24 1316   BP: 125/81   Pulse: 93   Resp: 18   Temp: 98.3 °F (36.8 °C)   TempSrc: Temporal   SpO2: 99%   Weight: 73.4 kg (161 lb 12.8 oz)   Height: 154.9 cm (60.98\")   PainSc:   6   PainLoc: Hip           ECOG score: 0         PHQ-9 Total Score:         Physical Exam  Vitals reviewed. Exam conducted with a chaperone present.   Constitutional:       General: She is not in acute distress.     Appearance: Normal appearance.   HENT:      Head: Normocephalic and atraumatic.   Eyes:      Extraocular Movements: Extraocular movements intact.      Conjunctiva/sclera: Conjunctivae normal.   Cardiovascular:      Comments: Candidal intertrigo under right breast, right nipple retraction, no right breast mass, or b/l cervical, axillary or supraclavicular lymphadenopathy palpated.   Pulmonary:      Effort: Pulmonary effort is normal.   Musculoskeletal:      Cervical back: " "Normal range of motion and neck supple.   Skin:     General: Skin is warm and dry.      Findings: No bruising.   Neurological:      Mental Status: She is oriented to person, place, and time.           Past Medical History     Past Medical History:   Diagnosis Date    Breast cancer     Diabetes mellitus     Disease of thyroid gland     Hypertension      Current Outpatient Medications on File Prior to Visit   Medication Sig Dispense Refill    albuterol sulfate HFA (Ventolin HFA) 108 (90 Base) MCG/ACT inhaler Inhale 2 puffs 4 (Four) Times a Day. 18 g 2    atorvastatin (LIPITOR) 20 MG tablet Take 1 tablet by mouth Every Evening. 90 tablet 0    Continuous Blood Gluc Sensor (Dexcom G6 Sensor) 1 Application by Other route Every 10 (Ten) Days. 3 each 0    Continuous Blood Gluc Transmit (Dexcom G6 Transmitter) misc Apply to abdominal, Arm or Buttock 90 days 1 each 2    Continuous Glucose Sensor (Dexcom G7 Sensor) misc 1 Application by Other route Every 10 (Ten) Days. 9 each 3    Cyanocobalamin (B-12) 1000 MCG tablet 1 tab(s) orally once a day 30 day(s) 30 tablet 2    ibuprofen (ADVIL,MOTRIN) 800 MG tablet Take 1 tablet by mouth Every 8 (Eight) Hours As Needed for Mild Pain or Moderate Pain. 15 tablet 0    Insulin Glargine-yfgn (Semglee, yfgn,) 100 UNIT/ML solution pen-injector Inject 30 Units under the skin into the appropriate area as directed every night at bedtime for 30 days. 15 mL 2    Insulin Pen Needle 32G X 4 MM misc Inject 1 application under the skin into the appropriate area as directed up to 5 (Five) Times a Day As Needed. 150 each 2    insulin regular (NovoLIN R) 100 UNIT/ML injection Inject Up to  10 Units under the skin into the appropriate area as directed 3 (Three) Times a Day With Meals. 30 mL 0    Insulin Syringe (TRUEplus Insulin Syringe) 29G X 1/2\" 1 ML misc Take As Directed 3 times a day 100 each 11    Insulin Syringe 29G X 1/2\" 1 ML misc 1 application 3 (Three) Times a Day. 180 each 3    levothyroxine " (Synthroid) 50 MCG tablet Take 1 tablet by mouth Daily. 90 tablet 0    ondansetron ODT (ZOFRAN-ODT) 4 MG disintegrating tablet Place 1 tablet on the tongue 4 (Four) Times a Day As Needed for Nausea or Vomiting. 20 tablet 0    Semaglutide, 2 MG/DOSE, (Ozempic, 2 MG/DOSE,) 8 MG/3ML solution pen-injector Inject 2 mg subcutaneously once a week 3 mL 2    SITagliptin-metFORMIN HCl ER (Janumet XR) 100-1000 MG tablet take 1 tablet by mouth  (in the evening) 90 days 90 tablet 0    tamoxifen (NOLVADEX) 20 MG chemo tablet Take 1 tablet by mouth Daily. 90 tablet 3    vitamin D (ERGOCALCIFEROL) 1.25 MG (72137 UT) capsule capsule Take 1 capsule by mouth 2 (Two) Times a Week. 25 capsule 0    albuterol sulfate HFA (Ventolin HFA) 108 (90 Base) MCG/ACT inhaler Inhale 2 puffs 4 (Four) Times a Day. 18 g 2    atorvastatin (LIPITOR) 20 MG tablet 1 tab(s) orally once a day in EVENING 90 days 90 tablet 0    Continuous Blood Gluc Sensor (FreeStyle Carla 3 Sensor) misc Change as directed every 14 days 6 each 0    Insulin Glargine-yfgn (Semglee, yfgn,) 100 UNIT/ML solution pen-injector Inject 30 Units under the skin into the appropriate area as directed every night at bedtime. 15 mL 2    insulin regular (NovoLIN R) 100 UNIT/ML injection Inject Up To 10 Units under the skin into the appropriate area as directed 3 (Three) Times a Day With Meals. 120 mL 0    levothyroxine (Synthroid) 50 MCG tablet Take 1 tablet by mouth Daily. 90 tablet 0    SITagliptin-metFORMIN HCl ER (Janumet XR) 100-1000 MG tablet take 1 tablet by mouth once a day (in the evening) 90 tablet 0    Tirzepatide (Mounjaro) 12.5 MG/0.5ML solution pen-injector pen Inject 0.5 mL under the skin into the appropriate area as directed Every 7 (Seven) Days. (Patient not taking: Reported on 6/20/2024) 2 mL 2    vitamin D (ERGOCALCIFEROL) 1.25 MG (54082 UT) capsule capsule Take 1 capsule by mouth 2 (Two) Times a Week. 25 capsule 0    [DISCONTINUED] Insulin Glargine (Lantus SoloStar) 100  "UNIT/ML injection pen Inject 30 units subcutaneously At Bedtime 15 mL 2     No current facility-administered medications on file prior to visit.      Allergies   Allergen Reactions    Sulfa Antibiotics Rash and Unknown - Low Severity    Fenofibrate Unknown - Low Severity     Past Surgical History:   Procedure Laterality Date     SECTION      MASTECTOMY Left      Social History     Socioeconomic History    Marital status: Legally    Tobacco Use    Smoking status: Former     Types: Cigarettes    Smokeless tobacco: Never    Tobacco comments:     N/A   Vaping Use    Vaping status: Never Used   Substance and Sexual Activity    Alcohol use: Yes     Comment: occasionally    Drug use: Never    Sexual activity: Defer     Family History   Problem Relation Age of Onset    Hypertension Mother     Hypertension Father     Diabetes Paternal Grandfather        Results     Result Review   The following data was reviewed by: Ye Flores MD on 2023:  Lab Results   Component Value Date    HGB 14.4 2024    HCT 41.5 2024    MCV 88.1 2024     2024    WBC 7.62 2024    NEUTROABS 3.63 2024    LYMPHSABS 2.88 2024    MONOSABS 0.62 2024    EOSABS 0.44 (H) 2024    BASOSABS 0.04 2024     Lab Results   Component Value Date    GLUCOSE 189 (H) 2024    BUN 10 2024    CREATININE 0.74 2024     2024    K 4.0 2024     2024    CO2 26.6 2024    CALCIUM 9.3 2024    PROTEINTOT 7.0 2024    ALBUMIN 4.4 2024    BILITOT 0.4 2024    ALKPHOS 69 2024    AST 32 2024    ALT 32 2024     No results found for: \"MG\", \"PHOS\", \"FREET4\", \"TSH\"        No radiology results for the last day       Assessment & Plan     Diagnoses and all orders for this visit:    1. Malignant neoplasm of left breast in female, estrogen receptor positive, unspecified site of breast (Primary)  -     Mammo " Diagnostic Digital Tomosynthesis Right With CAD; Future  -     US Breast Right Limited; Future  -     nystatin (MYCOSTATIN) 513058 UNIT/GM cream; Apply 1 Application topically to the appropriate area as directed 2 (Two) Times a Day.  Dispense: 30 g; Refill: 1  -     Ambulatory Referral to General Surgery    2. Inversion of right nipple  -     Mammo Diagnostic Digital Tomosynthesis Right With CAD; Future  -     US Breast Right Limited; Future  -     nystatin (MYCOSTATIN) 176664 UNIT/GM cream; Apply 1 Application topically to the appropriate area as directed 2 (Two) Times a Day.  Dispense: 30 g; Refill: 1  -     Ambulatory Referral to General Surgery              Enriqueta Massey is a 53 y.o. female who presents to [facility] HEMATOLOGY & ONCOLOGY for toxicity check regarding tamoxifen for Left Breast Cancer, diagnosed 8/17/2016, s/p left mastectomy, previously on Exemestane 25mg PO QD not tolerated which began around 6/2018. Pt due to stop tamoxifen in 6/2028.    -- Recommend patient continue with annual mammogram, completed in March 2024 was benign, due again in 3/2025  -currently screening pt with alternate MRI Breast with mammogram every 6 months, she is due for this in 9/2024, orders placed  --Discussed results of most recent CBC and CMP, which were normal    -Pt here after increased pruritus and pain under right breast, there is evidence of candidal intertrigo, thus ordered nystatin cream for pt to apply to this area  -However, pt also with right nipple retraction and with pain in this area, thus ordered diagnostic mammogram of right breast and refer patient to her breast surgeon Dr. Carmichael for further evaluation  -pt already has MRI breast ordered and currently scheduled for 9/2024    - In the meantime, recommend she continue tamoxifen 20 mg p.o. once daily, provided refill today      Plan patient follow-up in 6 weeks with imaging and labs.      Please note that portions of this note were  completed with a voice recognition program.      Electronically signed by Ye Flores MD, 06/20/24, 2:36 PM EDT.      Follow Up     I spent 30 minutes caring for Enriqueta on this date of service. This time includes time spent by me in the following activities:preparing for the visit, reviewing tests, obtaining and/or reviewing a separately obtained history, performing a medically appropriate examination and/or evaluation , counseling and educating the patient/family/caregiver, ordering medications, tests, or procedures, referring and communicating with other health care professionals , documenting information in the medical record, independently interpreting results and communicating that information with the patient/family/caregiver, and care coordination.    This is an acute or chronic illness that poses a threat to life or bodily function. The above treatment plan involves a high risk of complications and/or mortality of patient management.    The patient was seen and examined. Work by the provider also included review and/or ordering of lab tests, review and/or ordering of radiology tests, review and/or ordering of medicine tests, discussion with other physicians or providers, independent review of data, obtaining old records, review/summation of old records, and/or other review.    I have reviewed the family history, social history, and past medical history for this patient. Previous information and data has been reviewed and updated as needed. I have reviewed and verified the chief complaint, history, and other documentation. The patient was interviewed and examined in the clinic and the chart reviewed. The previous observations, recommendations, and conclusions were reviewed including those of other providers.     The plan was discussed with the patient and/or family. The patient was given time to ask questions and these questions were answered. At the conclusion of their visit they had no additional  questions or concerns and all questions were answered to their satisfaction.    Patient was given instructions and counseling regarding her condition or for health maintenance advice. Please see specific information pulled into the AVS if appropriate.

## 2024-06-25 ENCOUNTER — HOSPITAL ENCOUNTER (OUTPATIENT)
Dept: ULTRASOUND IMAGING | Facility: HOSPITAL | Age: 54
Discharge: HOME | End: 2024-06-25
Payer: COMMERCIAL

## 2024-06-25 ENCOUNTER — HOSPITAL ENCOUNTER (OUTPATIENT)
Dept: MAMMOGRAPHY | Facility: HOSPITAL | Age: 54
Discharge: HOME | End: 2024-06-25
Payer: COMMERCIAL

## 2024-06-25 DIAGNOSIS — N64.59 INVERSION OF RIGHT NIPPLE: ICD-10-CM

## 2024-06-25 DIAGNOSIS — Z17.0 MALIGNANT NEOPLASM OF LEFT BREAST IN FEMALE, ESTROGEN RECEPTOR POSITIVE, UNSPECIFIED SITE OF BREAST: ICD-10-CM

## 2024-06-25 DIAGNOSIS — C50.912 MALIGNANT NEOPLASM OF LEFT BREAST IN FEMALE, ESTROGEN RECEPTOR POSITIVE, UNSPECIFIED SITE OF BREAST: ICD-10-CM

## 2024-06-25 PROCEDURE — G0279 TOMOSYNTHESIS, MAMMO: HCPCS

## 2024-06-25 PROCEDURE — 76642 ULTRASOUND BREAST LIMITED: CPT

## 2024-06-25 PROCEDURE — 77065 DX MAMMO INCL CAD UNI: CPT

## 2024-07-01 ENCOUNTER — APPOINTMENT (OUTPATIENT)
Dept: GENERAL RADIOLOGY | Facility: HOSPITAL | Age: 54
End: 2024-07-01
Payer: OTHER MISCELLANEOUS

## 2024-07-01 ENCOUNTER — HOSPITAL ENCOUNTER (EMERGENCY)
Facility: HOSPITAL | Age: 54
Discharge: HOME | End: 2024-07-01
Attending: EMERGENCY MEDICINE | Admitting: EMERGENCY MEDICINE
Payer: OTHER MISCELLANEOUS

## 2024-07-01 VITALS
RESPIRATION RATE: 16 BRPM | SYSTOLIC BLOOD PRESSURE: 154 MMHG | OXYGEN SATURATION: 100 % | HEART RATE: 71 BPM | BODY MASS INDEX: 31.01 KG/M2 | WEIGHT: 164.24 LBS | HEIGHT: 61 IN | DIASTOLIC BLOOD PRESSURE: 84 MMHG | TEMPERATURE: 98.1 F

## 2024-07-01 DIAGNOSIS — M54.50 ACUTE BILATERAL LOW BACK PAIN WITHOUT SCIATICA: ICD-10-CM

## 2024-07-01 DIAGNOSIS — S39.012A STRAIN OF LUMBAR REGION, INITIAL ENCOUNTER: Primary | ICD-10-CM

## 2024-07-01 PROCEDURE — 25010000002 ORPHENADRINE CITRATE PER 60 MG

## 2024-07-01 PROCEDURE — 72072 X-RAY EXAM THORAC SPINE 3VWS: CPT

## 2024-07-01 PROCEDURE — 72100 X-RAY EXAM L-S SPINE 2/3 VWS: CPT

## 2024-07-01 PROCEDURE — 25010000002 KETOROLAC TROMETHAMINE PER 15 MG

## 2024-07-01 PROCEDURE — 99283 EMERGENCY DEPT VISIT LOW MDM: CPT

## 2024-07-01 PROCEDURE — 96372 THER/PROPH/DIAG INJ SC/IM: CPT

## 2024-07-01 PROCEDURE — 97110 THERAPEUTIC EXERCISES: CPT | Performed by: PHYSICAL THERAPIST

## 2024-07-01 PROCEDURE — 97161 PT EVAL LOW COMPLEX 20 MIN: CPT | Performed by: PHYSICAL THERAPIST

## 2024-07-01 RX ORDER — IBUPROFEN 800 MG/1
800 TABLET ORAL EVERY 6 HOURS PRN
Qty: 20 TABLET | Refills: 0 | Status: SHIPPED | OUTPATIENT
Start: 2024-07-01

## 2024-07-01 RX ORDER — ORPHENADRINE CITRATE 100 MG/1
100 TABLET, EXTENDED RELEASE ORAL 2 TIMES DAILY
Qty: 15 TABLET | Refills: 0 | Status: SHIPPED | OUTPATIENT
Start: 2024-07-01

## 2024-07-01 RX ORDER — KETOROLAC TROMETHAMINE 30 MG/ML
30 INJECTION, SOLUTION INTRAMUSCULAR; INTRAVENOUS ONCE
Status: COMPLETED | OUTPATIENT
Start: 2024-07-01 | End: 2024-07-01

## 2024-07-01 RX ORDER — ORPHENADRINE CITRATE 30 MG/ML
60 INJECTION INTRAMUSCULAR; INTRAVENOUS ONCE
Status: COMPLETED | OUTPATIENT
Start: 2024-07-01 | End: 2024-07-01

## 2024-07-01 RX ADMIN — KETOROLAC TROMETHAMINE 30 MG: 30 INJECTION, SOLUTION INTRAMUSCULAR; INTRAVENOUS at 13:32

## 2024-07-01 RX ADMIN — ORPHENADRINE CITRATE 60 MG: 60 INJECTION INTRAMUSCULAR; INTRAVENOUS at 13:32

## 2024-07-01 NOTE — Clinical Note
[facility] EMERGENCY ROOM  [address]  [place] [address]  Phone: [phone]  Fax: [phone]    Enriqueta Massey was seen and treated in our emergency department on 7/1/2024.  She may return to work on 07/02/2024.         Thank you for choosing [facility].    Shruti Castelan, APRN
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
## 2024-07-01 NOTE — DISCHARGE INSTRUCTIONS
With your primary care provider as needed if pain does not resolve.  Return to the ED for worsening pain, numbness or tingling of your lower extremities, loss of control of bowel or bladder.

## 2024-07-12 ENCOUNTER — PATIENT OUTREACH (OUTPATIENT)
Dept: CASE MANAGEMENT | Facility: OTHER | Age: 54
End: 2024-07-12
Payer: OTHER MISCELLANEOUS

## 2024-07-12 NOTE — OUTREACH NOTE
"Adult Wellness Assessment  Questions/Answers      Flowsheet Row Most Recent Value   How often do you have someone help you read facts about your health? never   How often do you have trouble learning about your health because you don't know what the written words mean? never   How confident are you filling out forms by yourself? always          Adult Patient Profile  Questions/Answers      Flowsheet Row Most Recent Value   Symptoms/Conditions Managed at Home diabetes, type 2   Diabetes Management Strategies blood glucose testing, medication therapy   Diabetes Comment Telephone call with patient. Discussed diabetes.  Patient  has an order for  Dexcom. We discussed the value of being aware of what blood glucose levels are to help make better choices with diet.  Emailed patient diabetic education to her personal email per her ok. Reports no issues getting medication.            AMBULATORY CASE MANAGEMENT NOTE    Names and Relationships of Patient/Support Persons: Contact: Enriqueta Massey \"JAROD\"; Relationship: Self  Contact: Enriqueta Massey \"JAROD\"; Relationship: Self  Contact: Enriqueta Massey \"JAROD\"; Relationship: Self -     Patient Outreach    Telephone call with patient Engaged in the Employee Case Management Program(ECM).  Discussed diabetes.  Emailed diabetes education, ECM information , 24 hour nurse call line number and advance care planning information.       No issue with social determinants,denies food, medication, transportation, or financial insecurities. No questions or concerns per pt at this time. Advised pt to call RN-ECM with any new needs. Encouraged use of 24hour nurseline if needed.  Agrees to follow up outreach.          Education Documentation  No documentation found.        DARREL MAXWELL  Ambulatory Case Management    7/12/2024, 10:59 EDT  "

## 2024-08-15 ENCOUNTER — PATIENT OUTREACH (OUTPATIENT)
Dept: CASE MANAGEMENT | Facility: OTHER | Age: 54
End: 2024-08-15
Payer: OTHER MISCELLANEOUS

## 2024-08-15 NOTE — OUTREACH NOTE
"  Adult Patient Profile  Questions/Answers      Flowsheet Row Most Recent Value   Symptoms/Conditions Managed at Home diabetes, type 2   Diabetes Management Strategies blood glucose testing, medication therapy   Frequency of Blood Glucose Testing continuous  [Dexcom]   Last A1C Result Patient reports she is getting lab next week   Diabetes Comment Telephone call with patient. Discussed diabetes management. Patient reports she is using Dexcom and likes it.  Reports no issues getting her medication.  Patient reports she could do better with diet.  Encouraged patient to discuss with provider regarding a dietician consult if she would like.          AMBULATORY CASE MANAGEMENT NOTE    Names and Relationships of Patient/Support Persons: Contact: Enriqueta Massey \"JAROD\"; Relationship: Self  Contact: Enriqueta Massey \"JAROD\"; Relationship: Self  Contact: Enriqueta Massey \"JAROD\"; Relationship: Self  Contact: Enriqueta Massey \"JAROD\"; Relationship: Self -     Patient Outreach    Telephone call with patient. Discussed diabetes management. Encouraged patient to reach out to Employee Case management as needed.    Education Documentation  No documentation found.        DARREL MAXWELL  Ambulatory Case Management    8/15/2024, 12:30 EDT  "

## 2024-09-16 ENCOUNTER — HOSPITAL ENCOUNTER (OUTPATIENT)
Dept: MRI IMAGING | Facility: HOSPITAL | Age: 54
Discharge: HOME | End: 2024-09-16
Admitting: INTERNAL MEDICINE
Payer: COMMERCIAL

## 2024-09-16 DIAGNOSIS — Z17.0 MALIGNANT NEOPLASM OF LEFT BREAST IN FEMALE, ESTROGEN RECEPTOR POSITIVE, UNSPECIFIED SITE OF BREAST: ICD-10-CM

## 2024-09-16 DIAGNOSIS — C50.912 MALIGNANT NEOPLASM OF LEFT BREAST IN FEMALE, ESTROGEN RECEPTOR POSITIVE, UNSPECIFIED SITE OF BREAST: ICD-10-CM

## 2024-09-16 DIAGNOSIS — Z12.39 ENCOUNTER FOR BREAST CANCER SCREENING USING NON-MAMMOGRAM MODALITY: ICD-10-CM

## 2024-09-16 DIAGNOSIS — Z12.31 ENCOUNTER FOR SCREENING MAMMOGRAM FOR MALIGNANT NEOPLASM OF BREAST: ICD-10-CM

## 2024-09-16 PROCEDURE — 0 GADOBENATE DIMEGLUMINE 529 MG/ML SOLUTION: Performed by: INTERNAL MEDICINE

## 2024-09-16 PROCEDURE — A9577 INJ MULTIHANCE: HCPCS | Performed by: INTERNAL MEDICINE

## 2024-09-16 PROCEDURE — 77049 MRI BREAST C-+ W/CAD BI: CPT

## 2024-09-16 RX ADMIN — GADOBENATE DIMEGLUMINE 15 ML: 529 INJECTION, SOLUTION INTRAVENOUS at 13:38

## 2024-09-18 ENCOUNTER — LAB (OUTPATIENT)
Dept: ONCOLOGY | Facility: HOSPITAL | Age: 54
End: 2024-09-18
Payer: OTHER MISCELLANEOUS

## 2024-09-18 ENCOUNTER — OFFICE VISIT (OUTPATIENT)
Dept: ONCOLOGY | Facility: HOSPITAL | Age: 54
End: 2024-09-18
Payer: OTHER MISCELLANEOUS

## 2024-09-18 VITALS
BODY MASS INDEX: 31.34 KG/M2 | WEIGHT: 166 LBS | DIASTOLIC BLOOD PRESSURE: 99 MMHG | SYSTOLIC BLOOD PRESSURE: 153 MMHG | HEART RATE: 88 BPM | RESPIRATION RATE: 18 BRPM | HEIGHT: 61 IN | TEMPERATURE: 99 F | OXYGEN SATURATION: 98 %

## 2024-09-18 DIAGNOSIS — Z17.0 MALIGNANT NEOPLASM OF LEFT BREAST IN FEMALE, ESTROGEN RECEPTOR POSITIVE, UNSPECIFIED SITE OF BREAST: ICD-10-CM

## 2024-09-18 DIAGNOSIS — C50.912 MALIGNANT NEOPLASM OF LEFT BREAST IN FEMALE, ESTROGEN RECEPTOR POSITIVE, UNSPECIFIED SITE OF BREAST: ICD-10-CM

## 2024-09-18 DIAGNOSIS — Z12.31 ENCOUNTER FOR SCREENING MAMMOGRAM FOR MALIGNANT NEOPLASM OF BREAST: Primary | ICD-10-CM

## 2024-09-18 LAB
ALBUMIN SERPL-MCNC: 4.1 G/DL (ref 3.5–5.2)
ALBUMIN/GLOB SERPL: 1.5 G/DL
ALP SERPL-CCNC: 83 U/L (ref 39–117)
ALT SERPL W P-5'-P-CCNC: 22 U/L (ref 1–33)
ANION GAP SERPL CALCULATED.3IONS-SCNC: 7.2 MMOL/L (ref 5–15)
AST SERPL-CCNC: 24 U/L (ref 1–32)
BASOPHILS # BLD AUTO: 0.04 10*3/MM3 (ref 0–0.2)
BASOPHILS NFR BLD AUTO: 0.6 % (ref 0–1.5)
BILIRUB SERPL-MCNC: 0.2 MG/DL (ref 0–1.2)
BUN SERPL-MCNC: 12 MG/DL (ref 6–20)
BUN/CREAT SERPL: 16.2 (ref 7–25)
CALCIUM SPEC-SCNC: 9.3 MG/DL (ref 8.6–10.5)
CHLORIDE SERPL-SCNC: 103 MMOL/L (ref 98–107)
CO2 SERPL-SCNC: 29.8 MMOL/L (ref 22–29)
CREAT SERPL-MCNC: 0.74 MG/DL (ref 0.57–1)
DEPRECATED RDW RBC AUTO: 40.4 FL (ref 37–54)
EGFRCR SERPLBLD CKD-EPI 2021: 96.3 ML/MIN/1.73
EOSINOPHIL # BLD AUTO: 0.38 10*3/MM3 (ref 0–0.4)
EOSINOPHIL NFR BLD AUTO: 5.8 % (ref 0.3–6.2)
ERYTHROCYTE [DISTWIDTH] IN BLOOD BY AUTOMATED COUNT: 12 % (ref 12.3–15.4)
GLOBULIN UR ELPH-MCNC: 2.8 GM/DL
GLUCOSE SERPL-MCNC: 243 MG/DL (ref 65–99)
HCT VFR BLD AUTO: 41.4 % (ref 34–46.6)
HGB BLD-MCNC: 13.9 G/DL (ref 12–15.9)
IMM GRANULOCYTES # BLD AUTO: 0.02 10*3/MM3 (ref 0–0.05)
IMM GRANULOCYTES NFR BLD AUTO: 0.3 % (ref 0–0.5)
LYMPHOCYTES # BLD AUTO: 2.21 10*3/MM3 (ref 0.7–3.1)
LYMPHOCYTES NFR BLD AUTO: 33.5 % (ref 19.6–45.3)
MCH RBC QN AUTO: 30.5 PG (ref 26.6–33)
MCHC RBC AUTO-ENTMCNC: 33.6 G/DL (ref 31.5–35.7)
MCV RBC AUTO: 91 FL (ref 79–97)
MONOCYTES # BLD AUTO: 0.58 10*3/MM3 (ref 0.1–0.9)
MONOCYTES NFR BLD AUTO: 8.8 % (ref 5–12)
NEUTROPHILS NFR BLD AUTO: 3.37 10*3/MM3 (ref 1.7–7)
NEUTROPHILS NFR BLD AUTO: 51 % (ref 42.7–76)
PLATELET # BLD AUTO: 144 10*3/MM3 (ref 140–450)
PMV BLD AUTO: 11.1 FL (ref 6–12)
POTASSIUM SERPL-SCNC: 3.9 MMOL/L (ref 3.5–5.2)
PROT SERPL-MCNC: 6.9 G/DL (ref 6–8.5)
RBC # BLD AUTO: 4.55 10*6/MM3 (ref 3.77–5.28)
SODIUM SERPL-SCNC: 140 MMOL/L (ref 136–145)
WBC NRBC COR # BLD AUTO: 6.6 10*3/MM3 (ref 3.4–10.8)

## 2024-09-18 PROCEDURE — 36415 COLL VENOUS BLD VENIPUNCTURE: CPT

## 2024-09-18 PROCEDURE — G0463 HOSPITAL OUTPT CLINIC VISIT: HCPCS | Performed by: INTERNAL MEDICINE

## 2024-09-18 PROCEDURE — 85025 COMPLETE CBC W/AUTO DIFF WBC: CPT

## 2024-09-18 PROCEDURE — 80053 COMPREHEN METABOLIC PANEL: CPT

## 2024-09-18 RX ORDER — TAMOXIFEN CITRATE 20 MG/1
20 TABLET ORAL DAILY
Qty: 90 TABLET | Refills: 3 | Status: SHIPPED | OUTPATIENT
Start: 2024-09-18

## 2025-04-07 ENCOUNTER — TELEPHONE (OUTPATIENT)
Dept: ONCOLOGY | Facility: HOSPITAL | Age: 55
End: 2025-04-07
Payer: COMMERCIAL

## 2025-04-07 NOTE — TELEPHONE ENCOUNTER
"  Caller: Enriqueta Massey \"JAROD\"    Relationship to patient: Self    Best call back number: [phone]    Chief complaint: PATIENT WANTED AN EARLIER TIME    Type of visit: FOLLOW UP     Requested date: 4-16-25 THE EARLIEST TIME POSSIBLE      If rescheduling, when is the original appointment: 4-16-25     "

## 2025-04-11 ENCOUNTER — HOSPITAL ENCOUNTER (OUTPATIENT)
Dept: MAMMOGRAPHY | Facility: HOSPITAL | Age: 55
Discharge: HOME | End: 2025-04-11
Admitting: INTERNAL MEDICINE
Payer: COMMERCIAL

## 2025-04-11 DIAGNOSIS — Z12.31 ENCOUNTER FOR SCREENING MAMMOGRAM FOR MALIGNANT NEOPLASM OF BREAST: ICD-10-CM

## 2025-04-11 PROCEDURE — 77063 BREAST TOMOSYNTHESIS BI: CPT

## 2025-04-11 PROCEDURE — 77067 SCR MAMMO BI INCL CAD: CPT

## 2025-04-14 ENCOUNTER — LAB (OUTPATIENT)
Dept: ONCOLOGY | Facility: HOSPITAL | Age: 55
End: 2025-04-14
Payer: COMMERCIAL

## 2025-04-14 DIAGNOSIS — Z17.0 MALIGNANT NEOPLASM OF LEFT BREAST IN FEMALE, ESTROGEN RECEPTOR POSITIVE, UNSPECIFIED SITE OF BREAST: ICD-10-CM

## 2025-04-14 DIAGNOSIS — C50.912 MALIGNANT NEOPLASM OF LEFT BREAST IN FEMALE, ESTROGEN RECEPTOR POSITIVE, UNSPECIFIED SITE OF BREAST: ICD-10-CM

## 2025-04-14 LAB
ALBUMIN SERPL-MCNC: 4.2 G/DL (ref 3.5–5.2)
ALBUMIN/GLOB SERPL: 1.4 G/DL
ALP SERPL-CCNC: 70 U/L (ref 39–117)
ALT SERPL W P-5'-P-CCNC: 27 U/L (ref 1–33)
ANION GAP SERPL CALCULATED.3IONS-SCNC: 10.5 MMOL/L (ref 5–15)
AST SERPL-CCNC: 34 U/L (ref 1–32)
BASOPHILS # BLD AUTO: 0.07 10*3/MM3 (ref 0–0.2)
BASOPHILS NFR BLD AUTO: 0.9 % (ref 0–1.5)
BILIRUB SERPL-MCNC: 0.3 MG/DL (ref 0–1.2)
BUN SERPL-MCNC: 8 MG/DL (ref 6–20)
BUN/CREAT SERPL: 11 (ref 7–25)
CALCIUM SPEC-SCNC: 9.1 MG/DL (ref 8.6–10.5)
CHLORIDE SERPL-SCNC: 101 MMOL/L (ref 98–107)
CO2 SERPL-SCNC: 26.5 MMOL/L (ref 22–29)
CREAT SERPL-MCNC: 0.73 MG/DL (ref 0.57–1)
DEPRECATED RDW RBC AUTO: 40.3 FL (ref 37–54)
EGFRCR SERPLBLD CKD-EPI 2021: 97.9 ML/MIN/1.73
EOSINOPHIL # BLD AUTO: 0.58 10*3/MM3 (ref 0–0.4)
EOSINOPHIL NFR BLD AUTO: 7.6 % (ref 0.3–6.2)
ERYTHROCYTE [DISTWIDTH] IN BLOOD BY AUTOMATED COUNT: 12 % (ref 12.3–15.4)
GLOBULIN UR ELPH-MCNC: 2.9 GM/DL
GLUCOSE SERPL-MCNC: 120 MG/DL (ref 65–99)
HCT VFR BLD AUTO: 40.7 % (ref 34–46.6)
HGB BLD-MCNC: 13.9 G/DL (ref 12–15.9)
IMM GRANULOCYTES # BLD AUTO: 0.01 10*3/MM3 (ref 0–0.05)
IMM GRANULOCYTES NFR BLD AUTO: 0.1 % (ref 0–0.5)
LYMPHOCYTES # BLD AUTO: 2.44 10*3/MM3 (ref 0.7–3.1)
LYMPHOCYTES NFR BLD AUTO: 32 % (ref 19.6–45.3)
MCH RBC QN AUTO: 31.2 PG (ref 26.6–33)
MCHC RBC AUTO-ENTMCNC: 34.2 G/DL (ref 31.5–35.7)
MCV RBC AUTO: 91.3 FL (ref 79–97)
MONOCYTES # BLD AUTO: 0.63 10*3/MM3 (ref 0.1–0.9)
MONOCYTES NFR BLD AUTO: 8.3 % (ref 5–12)
NEUTROPHILS NFR BLD AUTO: 3.89 10*3/MM3 (ref 1.7–7)
NEUTROPHILS NFR BLD AUTO: 51.1 % (ref 42.7–76)
NRBC BLD AUTO-RTO: 0 /100 WBC (ref 0–0.2)
PLATELET # BLD AUTO: 141 10*3/MM3 (ref 140–450)
PMV BLD AUTO: 11.2 FL (ref 6–12)
POTASSIUM SERPL-SCNC: 4 MMOL/L (ref 3.5–5.2)
PROT SERPL-MCNC: 7.1 G/DL (ref 6–8.5)
RBC # BLD AUTO: 4.46 10*6/MM3 (ref 3.77–5.28)
SODIUM SERPL-SCNC: 138 MMOL/L (ref 136–145)
WBC NRBC COR # BLD AUTO: 7.62 10*3/MM3 (ref 3.4–10.8)

## 2025-04-14 PROCEDURE — 80053 COMPREHEN METABOLIC PANEL: CPT

## 2025-04-14 PROCEDURE — 85025 COMPLETE CBC W/AUTO DIFF WBC: CPT

## 2025-04-14 PROCEDURE — 36415 COLL VENOUS BLD VENIPUNCTURE: CPT

## 2025-04-16 ENCOUNTER — OFFICE VISIT (OUTPATIENT)
Dept: ONCOLOGY | Facility: HOSPITAL | Age: 55
End: 2025-04-16
Payer: COMMERCIAL

## 2025-04-16 VITALS
RESPIRATION RATE: 16 BRPM | BODY MASS INDEX: 31.3 KG/M2 | SYSTOLIC BLOOD PRESSURE: 128 MMHG | OXYGEN SATURATION: 95 % | HEIGHT: 61 IN | TEMPERATURE: 97.6 F | HEART RATE: 82 BPM | DIASTOLIC BLOOD PRESSURE: 86 MMHG | WEIGHT: 165.8 LBS

## 2025-04-16 DIAGNOSIS — C50.912 MALIGNANT NEOPLASM OF LEFT BREAST IN FEMALE, ESTROGEN RECEPTOR POSITIVE, UNSPECIFIED SITE OF BREAST: ICD-10-CM

## 2025-04-16 DIAGNOSIS — M25.552 LEFT HIP PAIN: Primary | ICD-10-CM

## 2025-04-16 DIAGNOSIS — Z17.0 MALIGNANT NEOPLASM OF LEFT BREAST IN FEMALE, ESTROGEN RECEPTOR POSITIVE, UNSPECIFIED SITE OF BREAST: ICD-10-CM

## 2025-04-16 PROCEDURE — G0463 HOSPITAL OUTPT CLINIC VISIT: HCPCS | Performed by: NURSE PRACTITIONER

## 2025-04-16 RX ORDER — TAMOXIFEN CITRATE 20 MG/1
20 TABLET ORAL DAILY
Qty: 90 TABLET | Refills: 3 | Status: SHIPPED | OUTPATIENT
Start: 2025-04-16
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
## 2025-04-18 ENCOUNTER — TELEPHONE (OUTPATIENT)
Dept: ONCOLOGY | Facility: HOSPITAL | Age: 55
End: 2025-04-18
Payer: COMMERCIAL

## 2025-04-18 NOTE — TELEPHONE ENCOUNTER
SPOKE WITH PATIENT AND GAVE HER APPT INFO FOR NM BONE SCAN AND MYCHART VIDEO VISIT WITH OUR APRN, TRICIA. PATIENT AWARE OF ALL APPTS AND HAS NO QUESTIONS AT THIS TIME.

## 2025-05-01 ENCOUNTER — HOSPITAL ENCOUNTER (OUTPATIENT)
Dept: NUCLEAR MEDICINE | Facility: HOSPITAL | Age: 55
Discharge: HOME | End: 2025-05-01
Payer: COMMERCIAL

## 2025-05-01 DIAGNOSIS — C50.912 MALIGNANT NEOPLASM OF LEFT BREAST IN FEMALE, ESTROGEN RECEPTOR POSITIVE, UNSPECIFIED SITE OF BREAST: ICD-10-CM

## 2025-05-01 DIAGNOSIS — Z17.0 MALIGNANT NEOPLASM OF LEFT BREAST IN FEMALE, ESTROGEN RECEPTOR POSITIVE, UNSPECIFIED SITE OF BREAST: ICD-10-CM

## 2025-05-01 DIAGNOSIS — M25.552 LEFT HIP PAIN: ICD-10-CM

## 2025-05-01 PROCEDURE — 78306 BONE IMAGING WHOLE BODY: CPT

## 2025-05-01 PROCEDURE — 34310000005 TECHNETIUM MEDRONATE KIT: Performed by: NURSE PRACTITIONER

## 2025-05-01 PROCEDURE — A9503 TC99M MEDRONATE: HCPCS | Performed by: NURSE PRACTITIONER

## 2025-05-01 RX ORDER — TC 99M MEDRONATE 20 MG/10ML
21.1 INJECTION, POWDER, LYOPHILIZED, FOR SOLUTION INTRAVENOUS
Status: COMPLETED | OUTPATIENT
Start: 2025-05-01 | End: 2025-05-01

## 2025-05-01 RX ADMIN — TC 99M MEDRONATE 21.1 MILLICURIE: 20 INJECTION, POWDER, LYOPHILIZED, FOR SOLUTION INTRAVENOUS at 08:45

## 2025-05-13 ENCOUNTER — TELEMEDICINE (OUTPATIENT)
Dept: ONCOLOGY | Facility: HOSPITAL | Age: 55
End: 2025-05-13
Payer: COMMERCIAL

## 2025-05-13 VITALS — HEIGHT: 61 IN | BODY MASS INDEX: 31.33 KG/M2

## 2025-05-13 DIAGNOSIS — C50.912 MALIGNANT NEOPLASM OF LEFT BREAST IN FEMALE, ESTROGEN RECEPTOR POSITIVE, UNSPECIFIED SITE OF BREAST: Primary | ICD-10-CM

## 2025-05-13 DIAGNOSIS — Z17.0 MALIGNANT NEOPLASM OF LEFT BREAST IN FEMALE, ESTROGEN RECEPTOR POSITIVE, UNSPECIFIED SITE OF BREAST: Primary | ICD-10-CM

## 2025-05-13 DIAGNOSIS — M25.552 LEFT HIP PAIN: ICD-10-CM
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
## 2025-07-09 DIAGNOSIS — M25.552 LEFT HIP PAIN: ICD-10-CM

## 2025-07-09 DIAGNOSIS — C50.912 MALIGNANT NEOPLASM OF LEFT BREAST IN FEMALE, ESTROGEN RECEPTOR POSITIVE, UNSPECIFIED SITE OF BREAST: ICD-10-CM

## 2025-07-09 DIAGNOSIS — Z17.0 MALIGNANT NEOPLASM OF LEFT BREAST IN FEMALE, ESTROGEN RECEPTOR POSITIVE, UNSPECIFIED SITE OF BREAST: ICD-10-CM

## 2025-07-09 RX ORDER — TAMOXIFEN CITRATE 20 MG/1
20 TABLET ORAL DAILY
Qty: 90 TABLET | Refills: 3 | Status: CANCELLED | OUTPATIENT
Start: 2025-07-09

## 2025-07-09 RX ORDER — TAMOXIFEN CITRATE 20 MG/1
20 TABLET ORAL DAILY
Qty: 90 TABLET | Refills: 3 | Status: SHIPPED | OUTPATIENT
Start: 2025-07-09

## 2025-07-09 NOTE — TELEPHONE ENCOUNTER
"  Caller: Enriqueta Masseyn \"JAROD\"    Relationship: Self    Best call back number: [phone]     Requested Prescriptions:   Requested Prescriptions     Pending Prescriptions Disp Refills    tamoxifen (NOLVADEX) 20 MG chemo tablet 90 tablet 3     Sig: Take 1 tablet by mouth Daily.        Pharmacy where request should be sent: [facility]     Last office visit with prescribing clinician: 9/18/2024   Last telemedicine visit with prescribing clinician: 5/13/2025   Next office visit with prescribing clinician: 10/21/2025     Additional details provided by patient:     Does the patient have less than a 3 day supply:  [x] Yes  [] No    Would you like a call back once the refill request has been completed: [] Yes [x] No    If the office needs to give you a call back, can they leave a voicemail: [] Yes [x] No  "